# Patient Record
Sex: FEMALE | Race: WHITE
[De-identification: names, ages, dates, MRNs, and addresses within clinical notes are randomized per-mention and may not be internally consistent; named-entity substitution may affect disease eponyms.]

---

## 2018-05-02 ENCOUNTER — HOSPITAL ENCOUNTER (EMERGENCY)
Dept: HOSPITAL 97 - ER | Age: 73
Discharge: HOME | End: 2018-05-02
Payer: COMMERCIAL

## 2018-05-02 DIAGNOSIS — Z79.82: ICD-10-CM

## 2018-05-02 DIAGNOSIS — K57.32: Primary | ICD-10-CM

## 2018-05-02 DIAGNOSIS — I10: ICD-10-CM

## 2018-05-02 DIAGNOSIS — E87.5: ICD-10-CM

## 2018-05-02 DIAGNOSIS — Z88.0: ICD-10-CM

## 2018-05-02 DIAGNOSIS — E03.9: ICD-10-CM

## 2018-05-02 DIAGNOSIS — E78.00: ICD-10-CM

## 2018-05-02 DIAGNOSIS — Z88.2: ICD-10-CM

## 2018-05-02 LAB
ALBUMIN SERPL BCP-MCNC: 3.7 G/DL (ref 3.2–5.5)
ALP SERPL-CCNC: 89 IU/L (ref 42–121)
ALT SERPL W P-5'-P-CCNC: 21 IU/L (ref 10–60)
AST SERPL W P-5'-P-CCNC: 23 IU/L (ref 10–42)
BUN BLD-MCNC: 32 MG/DL (ref 6–20)
GLUCOSE SERPLBLD-MCNC: 111 MG/DL (ref 65–120)
HCT VFR BLD CALC: 41.1 % (ref 36–45)
LIPASE SERPL-CCNC: 36 U/L (ref 22–51)
LYMPHOCYTES # SPEC AUTO: 0.9 K/UL (ref 0.7–4.9)
MCH RBC QN AUTO: 28.7 PG (ref 27–35)
MCV RBC: 83.6 FL (ref 80–100)
PMV BLD: 8.2 FL (ref 7.6–11.3)
POTASSIUM SERPL-SCNC: 5.2 MEQ/L (ref 3.6–5)
RBC # BLD: 4.92 M/UL (ref 3.86–4.86)
UA COMPLETE W REFLEX CULTURE PNL UR: (no result)

## 2018-05-02 PROCEDURE — 99284 EMERGENCY DEPT VISIT MOD MDM: CPT

## 2018-05-02 PROCEDURE — 96361 HYDRATE IV INFUSION ADD-ON: CPT

## 2018-05-02 PROCEDURE — 83690 ASSAY OF LIPASE: CPT

## 2018-05-02 PROCEDURE — 74177 CT ABD & PELVIS W/CONTRAST: CPT

## 2018-05-02 PROCEDURE — 80076 HEPATIC FUNCTION PANEL: CPT

## 2018-05-02 PROCEDURE — 81015 MICROSCOPIC EXAM OF URINE: CPT

## 2018-05-02 PROCEDURE — 96360 HYDRATION IV INFUSION INIT: CPT

## 2018-05-02 PROCEDURE — 36415 COLL VENOUS BLD VENIPUNCTURE: CPT

## 2018-05-02 PROCEDURE — 80048 BASIC METABOLIC PNL TOTAL CA: CPT

## 2018-05-02 PROCEDURE — 85025 COMPLETE CBC W/AUTO DIFF WBC: CPT

## 2018-05-02 PROCEDURE — 81003 URINALYSIS AUTO W/O SCOPE: CPT

## 2018-05-02 NOTE — RAD REPORT
EXAM DESCRIPTION:  CTAbdomen   Pelvis W Contrast - 5/2/2018 11:53 am

 

CLINICAL HISTORY:  Abdominal pain.

 

COMPARISON:  None.

 

TECHNIQUE:  Biphasic CT imaging of the abdomen and pelvis was performed with 100 ml non-ionic IV cont
rast.

 

All CT scans are performed using dose optimization technique as appropriate and may include automated
 exposure control or mA/KV adjustment according to patient size.

 

FINDINGS:  The lung bases are clear.

 

The liver, spleen, pancreas, right adrenal gland and kidneys are within normal limits. Mild thickenin
g of the left adrenal gland seen. Bilateral renal cysts are noted.

 

No bowel obstruction, free air, free fluid or abscess. Short segment of the descending colon demonstr
ates mild to moderate pericolonic inflammatory changes where several diverticula are noted compatible
 with mild acute diverticulitis. A more significant length of the sigmoid colon measuring about 8 cm 
demonstrates similar findings of pericolonic inflammatory changes in significant diverticulosis. This
 is compatible with additional area of acute diverticulitis. No peridiverticular abscess seen. The ap
pendix is normal.  No evidence of significant lymphadenopathy.

 

Moderate lumbosacral degenerative changes.

 

IMPRESSION:  Mild acute diverticulitis is present involving the sigmoid colon and distal descending c
olon without peridiverticular abscess or other complication seen.

## 2018-05-02 NOTE — EDPHYS
Physician Documentation                                                                           

 [facility]                                                                

Name: Margie Boyer                                                                          

Age: 72 yrs                                                                                       

Sex: Female                                                                                       

: 1945                                                                                   

MRN: [id number]                                                                                   

Arrival Date: 2018                                                                          

Time: 08:21                                                                                       

Account#: [id number]                                                                            

Bed 8                                                                                             

Private MD: Lili Roman                                                                         

ED Physician Sean Flores                                                                      

HPI:                                                                                              

                                                                                             

09:05 This 72 yrs old  Female presents to ER via Ambulatory with complaints of       jr8 

      Abdominal Pain.                                                                             

09:05 The patient presents with abdominal pain in the left upper quadrant, in the left lower  jr8 

      quadrant. Onset: The symptoms/episode began/occurred acutely, yesterday. The symptoms       

      do not radiate. Associated signs and symptoms: none. The symptoms are described as          

      sharp. Modifying factors: The symptoms are alleviated by nothing, the symptoms are          

      aggravated by nothing. Severity of pain: At its worst the pain was moderate in the          

      emergency department the pain is unchanged. The patient has not experienced similar         

      symptoms in the past. The patient has not recently seen a physician. Patient stated         

      that she has had abdominal on/off for the past few weeks. Last night was severe. Has        

      never had pain that bad. Denies fevers, n/v/d .                                             

                                                                                                  

Historical:                                                                                       

- Allergies:                                                                                      

09:00 PENICILLINS;                                                                            dm5 

09:00 Sulfa (Sulfonamide Antibiotics);                                                        dm5 

- Home Meds:                                                                                      

09:18 levothyroxine 25 mcg tab 1 tab once daily [Active]; nadolol 40 mg oral tab 1 tab once   dm5 

      daily [Active]; atorvastatin 10 mg oral tab 1 tab once daily [Active]; spironolactone       

      50 mg Oral tab 1 tab 2 times per day [Active]; alendronate 35 mg oral tab 1 tab once        

      wkly [Active]; Caltrate 600 + D oral oral [Active]; Fish Oil 1,000 mg oral cap daily        

      [Active]; Centrum Silver oral oral [Active]; Glucosamine oral oral [Active];                

      PreserVision Lutein oral oral [Active]; Pk Aspirin oral 81 mg oral 1 tab once daily      

      [Active];                                                                                   

- PMHx:                                                                                           

09:00 Hypertension; High Cholesterol; Hypothyroidism; Diverticulitis;                         dm5 

                                                                                                  

- Immunization history:: Adult Immunizations up to date.                                          

- Social history:: Smoking status: Patient/guardian denies using tobacco.                         

                                                                                                  

                                                                                                  

ROS:                                                                                              

09:05 Eyes: Negative for injury, pain, redness, and discharge, ENT: Negative for injury,      jr8 

      pain, and discharge, Neck: Negative for injury, pain, and swelling, Cardiovascular:         

      Negative for chest pain, palpitations, and edema, Respiratory: Negative for shortness       

      of breath, cough, wheezing, and pleuritic chest pain, Back: Negative for injury and         

      pain, MS/Extremity: Negative for injury and deformity, Skin: Negative for injury, rash,     

      and discoloration, Neuro: Negative for headache, weakness, numbness, tingling, and          

      seizure.                                                                                    

09:05 Abdomen/GI: Positive for abdominal pain, Negative for nausea, vomiting, and diarrhea,       

      abdominal distension, anorexia, dysphagia, hematemesis, black/tarry stool, rectal pain,     

      rectal bleeding, bowel incontinence, flatulence.                                            

                                                                                                  

Exam:                                                                                             

09:05 Eyes:  Pupils equal round and reactive to light, extra-ocular motions intact.  Lids and jr8 

      lashes normal.  Conjunctiva and sclera are non-icteric and not injected.  Cornea within     

      normal limits.  Periorbital areas with no swelling, redness, or edema. ENT:  Nares          

      patent. No nasal discharge, no septal abnormalities noted.  Tympanic membranes are          

      normal and external auditory canals are clear.  Oropharynx with no redness, swelling,       

      or masses, exudates, or evidence of obstruction, uvula midline.  Mucous membranes           

      moist. Neck:  Trachea midline, no thyromegaly or masses palpated, and no cervical           

      lymphadenopathy.  Supple, full range of motion without nuchal rigidity, or vertebral        

      point tenderness.  No Meningismus. Cardiovascular:  Regular rate and rhythm with a          

      normal S1 and S2.  No gallops, murmurs, or rubs.  Normal PMI, no JVD.  No pulse             

      deficits. Respiratory:  Lungs have equal breath sounds bilaterally, clear to                

      auscultation and percussion.  No rales, rhonchi or wheezes noted.  No increased work of     

      breathing, no retractions or nasal flaring. Back:  No spinal tenderness.  No                

      costovertebral tenderness.  Full range of motion. Skin:  Warm, dry with normal turgor.      

      Normal color with no rashes, no lesions, and no evidence of cellulitis. MS/ Extremity:      

      Pulses equal, no cyanosis.  Neurovascular intact.  Full, normal range of motion. Neuro:     

       Awake and alert, GCS 15, oriented to person, place, time, and situation.  Cranial          

      nerves II-XII grossly intact.  Motor strength 5/5 in all extremities.  Sensory grossly      

      intact.  Cerebellar exam normal.  Normal gait.                                              

09:05 Abdomen/GI: Inspection: obese Bowel sounds: active, all quadrants, Palpation: soft, in      

      all quadrants, moderate abdominal tenderness, in the mid left abdomen , mass, is not        

      appreciated, rebound tenderness, is not appreciated, voluntary guarding, is not             

      appreciated, involuntary guarding, is not appreciated, no appreciated organomegaly,         

      Indicators: McBurney's point is not tender, Freeman's sign is negative, Rovsing's sign       

      is negative, Liver: no appreciated palpable abnormalities, tenderness, is not               

      appreciated.                                                                                

                                                                                                  

Vital Signs:                                                                                      

09:00  / 90; Pulse 70; Resp 18; Temp 97.7; Pulse Ox 100% on R/A; Weight 104.33 kg (R);  dm5 

      Height 5 ft. 3 in. (160.02 cm); Pain 2/10;                                                  

10:29  / 80; Pulse 62; Resp 19; Pulse Ox 100% on R/A;                                   ae1 

12:04  / 57; Pulse 76; Resp 22; Pulse Ox 100% on R/A;                                   ae1 

12:39  / 90; Pulse 80; Resp 19; Pulse Ox 97% on R/A;                                    ae1 

09:00 Body Mass Index 40.74 (104.33 kg, 160.02 cm)                                            dm5 

                                                                                                  

MDM:                                                                                              

08:45 Patient medically screened.                                                             jr8 

12:33 Data reviewed: vital signs, nurses notes, lab test result(s), radiologic studies, CT    jr8 

      scan, and as a result, I will discharge patient. Data interpreted: Pulse oximetry: on       

      room air is 100 %. Interpretation: normal. Counseling: I had a detailed discussion with     

      the patient and/or guardian regarding: the historical points, exam findings, and any        

      diagnostic results supporting the discharge/admit diagnosis, lab results, radiology         

      results, the need for outpatient follow up, a family practitioner, to return to the         

      emergency department if symptoms worsen or persist or if there are any questions or         

      concerns that arise at home. ED course: Discussed with patient that she needs to be off     

      of her potassium sparing diuretic. Will give medicine to reduce potassium but that I        

      want her to be seen by Ms. Roman and have blood redraw this week. Patient understood        

      and would follow up .                                                                       

                                                                                                  

                                                                                             

09:05 Order name: Basic Metabolic Panel; Complete Time: 10:19                                 jr8 

                                                                                             

09:05 Order name: CBC with Diff; Complete Time: 10:                                                                                             

09:05 Order name: Creatinine for Radiology; Complete Time: 10:                                                                                             

09:05 Order name: Hepatic Function; Complete Time: 10:                                                                                             

09:05 Order name: Lipase; Complete Time: 10:                                                                                             

09:05 Order name: Urine Microscopic Only; Complete Time: 10:                                                                                             

09:05 Order name: IV Saline Lock; Complete Time: :                                                                                             

09:05 Order name: Labs collected and sent; Complete Time: :                                                                                             

09:43 Order name: Urine Dipstick--Ancillary (enter results); Complete Time: 10:             [facility] 

                                                                                             

10:20 Order name: CT Abd/Pelvis - W/Contrast; Complete Time: 12:                                                                                             

09:05 Order name: Urine Dipstick-Ancillary (obtain specimen); Complete Time: : 

                                                                                                  

Administered Medications:                                                                         

10:29 Drug: NS 0.9% 1000 ml Route: IV; Rate: 1000 ml; Site: right antecubital;                ae1 

12:51 Follow up: IV Status: Completed infusion                                                ae1 

12:30 Drug: Kayexalate 30 grams Route: PO;                                                    ae1 

12:51 Follow up: Response: Medication administered at discharge.                              ae1 

12:30 Drug: Cipro 500 mg Route: PO;                                                           ae1 

12:51 Follow up: Response: Medication administered at discharge.                              ae1 

12:30 Drug: Flagyl 1 grams Route: PO;                                                         ae1 

12:50 Follow up: Response: Medication administered at discharge.                              ae1 

                                                                                                  

                                                                                                  

Disposition:                                                                                      

18 12:36 Discharged to Home. Impression: Diverticulitis of large intestine without          

  perforation or abscess without bleeding, Hyperkalemia.                                          

- Condition is Stable.                                                                            

- Discharge Instructions: Diverticulitis, Hyperkalemia.                                           

- Prescriptions for Cipro 500 mg Oral Tablet - take 1 tablet by ORAL route every 12               

  hours for 10 days; 20 tablet. Flagyl 500 mg Oral Tablet - take 1 tablet by ORAL route           

  every 6 hours for 10 days; 40 tablet. Zofran 4 mg Oral Tablet - take 1 tablet by ORAL           

  route every 12 hours As needed; 20 tablet. Tramadol 50 mg Oral Tablet - take 1 tablet           

  by ORAL route every 8 hours as needed; 12 tablet.                                               

- Medication Reconciliation Form, Thank You Letter, Antibiotic Education, Prescription            

  Opioid Use form.                                                                                

- Follow up: Lili Roman; When: 1 - 2 days; Reason: Recheck today's complaints,                 

  Continuance of care, Re-evaluation by your physician.                                           

- Problem is new.                                                                                 

- Symptoms have improved.                                                                         

                                                                                                  

                                                                                                  

                                                                                                  

Addendum:                                                                                         

2018                                                                                        

     06:38 Co-signature as Attending Physician, Sean Flores MD I agree with the assessment and  w
a

           plan of care.                                                                          

                                                                                                  

Signatures:                                                                                       

Dispatcher [employer]                           EDMS                                                 

Rupali Guzmán, RN                    RN   dm5                                                  

Bartolo López PA                        PA   jr8                                                  

Vik Matamoros RN                     RN   ae1                                                  

Sean Flores MD MD   wa                                                   

                                                                                                  

Corrections: (The following items were deleted from the chart)                                    

                                                                                             

12:51 12:36 2018 12:36 Discharged to Home. Impression: Diverticulitis of large          ae1 

      intestine without perforation or abscess without bleeding; Hyperkalemia. Condition is       

      Stable. Forms are Medication Reconciliation Form, Thank You Letter, Antibiotic              

      Education, Prescription Opioid Use. Follow up: Lili Roman; When: 1 - 2 days; Reason:      

      Recheck today's complaints, Continuance of care, Re-evaluation by your physician.           

      Problem is new. Symptoms have improved. jr8                                                 

                                                                                                  

**************************************************************************************************

## 2018-05-02 NOTE — ER
Nurse's Notes                                                                                     

 [facility]                                                                

Name: Margie Boyer                                                                          

Age: 72 yrs                                                                                       

Sex: Female                                                                                       

: 1945                                                                                   

MRN: [id number]                                                                                   

Arrival Date: 2018                                                                          

Time: 08:21                                                                                       

Account#: [id number]                                                                            

Bed 8                                                                                             

Private MD: Lili Roman                                                                         

Diagnosis: Diverticulitis of large intestine without perforation or abscess without               

  bleeding;Hyperkalemia                                                                           

                                                                                                  

Presentation:                                                                                     

                                                                                             

08:56 Presenting complaint: Patient states: abdominal pain for about a week. Thought it was   dm5 

      diverticulitis flare but went to [facility] Roman and was told it probably was not that. Pt     

      had a KUB last week that showed some gas but no obstruction. Pt states it feels like        

      she may be constipated. Transition of care: patient was not received from another           

      setting of care. Onset of symptoms was 2018. Initial Sepsis Screen: Does the      

      patient meet any 2 criteria? No. Patient's initial sepsis screen is negative. Does the      

      patient have a suspected source of infection? No. Patient's initial sepsis screen is        

      negative. Care prior to arrival: None.                                                      

08:56 Method Of Arrival: Ambulatory                                                           dm5 

08:56 Acuity: ZEE 3                                                                           dm5 

                                                                                                  

Triage Assessment:                                                                                

09:00 General: Appears in no apparent distress. uncomfortable, Behavior is calm, cooperative. dm5 

      Pain: Complains of pain in anterior aspect of left lateral abdomen, left upper quadrant     

      and left lower quadrant Pain currently is 2 out of 10 on a pain scale. at worst was 7       

      out of 10 on a pain scale. Pain began about a week. Neuro: Level of Consciousness is        

      awake, alert, obeys commands, Oriented to person, place, time. Respiratory: Airway is       

      patent. GI: Abdomen is round obese, Bowel sounds present X 4 quads. Derm: Skin is pink,     

      warm \T\ dry.                                                                               

                                                                                                  

Historical:                                                                                       

- Allergies:                                                                                      

09:00 PENICILLINS;                                                                            dm5 

09:00 Sulfa (Sulfonamide Antibiotics);                                                        dm5 

- Home Meds:                                                                                      

09:18 levothyroxine 25 mcg tab 1 tab once daily [Active]; nadolol 40 mg oral tab 1 tab once   dm5 

      daily [Active]; atorvastatin 10 mg oral tab 1 tab once daily [Active]; spironolactone       

      50 mg Oral tab 1 tab 2 times per day [Active]; alendronate 35 mg oral tab 1 tab once        

      wkly [Active]; Caltrate 600 + D oral oral [Active]; Fish Oil 1,000 mg oral cap daily        

      [Active]; Centrum Silver oral oral [Active]; Glucosamine oral oral [Active];                

      PreserVision Lutein oral oral [Active]; Pk Aspirin oral 81 mg oral 1 tab once daily      

      [Active];                                                                                   

- PMHx:                                                                                           

09:00 Hypertension; High Cholesterol; Hypothyroidism; Diverticulitis;                         dm5 

                                                                                                  

- Immunization history:: Adult Immunizations up to date.                                          

- Social history:: Smoking status: Patient/guardian denies using tobacco.                         

                                                                                                  

                                                                                                  

Screening:                                                                                        

10:30 Abuse screen: Denies threats or abuse. Nutritional screening: No deficits noted.        ae1 

      Tuberculosis screening: No symptoms or risk factors identified. Fall Risk None              

      identified.                                                                                 

                                                                                                  

Assessment:                                                                                       

10:30 General: Appears in no apparent distress. comfortable, obese. Pain: Complains of pain   ae1 

      in abdomen. Neuro: Level of Consciousness is awake, alert, obeys commands, Oriented to      

      person, place, time, situation. Cardiovascular: Patient's skin is warm and dry.             

      Respiratory: Airway is patent Breath sounds are clear bilaterally. GI: Abdomen is round     

      obese, Bowel sounds present X 4 quads. Abdomen is firm. GI: Reports nausea. : No          

      signs and/or symptoms were reported regarding the genitourinary system. EENT: No signs      

      and/or symptoms were reported regarding the EENT system. Derm: Skin is pink, warm \T\       

      dry. Musculoskeletal: No signs and/or symptoms reported regarding the musculoskeletal       

      system.                                                                                     

10:44 Reassessment: Patient appears in no apparent distress at this time. Patient and/or      ae1 

      family updated on plan of care and expected duration. Pain level reassessed. Patient        

      states feeling better.                                                                      

11:51 Reassessment: Patient is in CT, IV infiltrated while IV contrast infusing, new IV       ae1 

      established by [facility] ED TECH.                                                            

                                                                                                  

Vital Signs:                                                                                      

09:00  / 90; Pulse 70; Resp 18; Temp 97.7; Pulse Ox 100% on R/A; Weight 104.33 kg (R);  dm5 

      Height 5 ft. 3 in. (160.02 cm); Pain 2/10;                                                  

10:29  / 80; Pulse 62; Resp 19; Pulse Ox 100% on R/A;                                   ae1 

12:04  / 57; Pulse 76; Resp 22; Pulse Ox 100% on R/A;                                   ae1 

12:39  / 90; Pulse 80; Resp 19; Pulse Ox 97% on R/A;                                    ae1 

09:00 Body Mass Index 40.74 (104.33 kg, 160.02 cm)                                            dm5 

                                                                                                  

ED Course:                                                                                        

08:21 Patient arrived in ED.                                                                  mr  

08:21 Lili Roman is Private Physician.                                                     mr  

08:45 Bartolo López PA is PHCP.                                                               jr8 

08:45 Sean Flores MD is Attending Physician.                                             jr8 

08:58 Triage completed.                                                                       dm5 

09:00 Arm band placed on right wrist. Patient placed in an exam room, on a stretcher.         dm5 

09:31 Initial lab(s) drawn, by me, sent to lab. Urine collected: clean catch specimen,        jb1 

      cloudy, deidre colored. Inserted saline lock: 22 gauge in right antecubital area, using      

      aseptic technique. Blood collected.                                                         

09:59 Vki Matamoros, RN is Primary Nurse.                                                   ae1 

10:30 Bed in low position. Call light in reach. Side rails up X 1. Adult w/ patient. Pulse ox ae1 

      on. NIBP on. Warm blanket given.                                                            

11:02 Patient moved to CT via wheelchair.                                                     kw1 

11:54 CT Abd/Pelvis - W/Contrast In Process Unspecified.                                      EDMS

12:35 Lili Roman is Referral Physician.                                                    jr8 

12:49 No provider procedures requiring assistance completed. IV discontinued, intact,         ae1 

      bleeding controlled, No redness/swelling at site. Pressure dressing applied.                

                                                                                                  

Administered Medications:                                                                         

10:29 Drug: NS 0.9% 1000 ml Route: IV; Rate: 1000 ml; Site: right antecubital;                ae1 

12:51 Follow up: IV Status: Completed infusion                                                ae1 

12:30 Drug: Kayexalate 30 grams Route: PO;                                                    ae1 

12:51 Follow up: Response: Medication administered at discharge.                              ae1 

12:30 Drug: Cipro 500 mg Route: PO;                                                           ae1 

12:51 Follow up: Response: Medication administered at discharge.                              ae1 

12:30 Drug: Flagyl 1 grams Route: PO;                                                         ae1 

12:50 Follow up: Response: Medication administered at discharge.                              ae1 

                                                                                                  

                                                                                                  

Outcome:                                                                                          

12:36 Discharge ordered by MD.                                                                jr8 

12:50 Discharged to home ambulatory, with significant other.                                  ae1 

12:50 Condition: stable                                                                           

12:50 Discharge instructions given to patient, Instructed on discharge instructions, follow       

      up and referral plans. medication usage, Demonstrated understanding of instructions,        

      Prescriptions given X 4.                                                                    

12:51 Patient left the ED.                                                                    ae1 

                                                                                                  

Signatures:                                                                                       

Dispatcher [employer]                           EDMS                                                 

Bart Baez                                 jb1                                                  

Rupali Guzmán, RN                    RN   dm5                                                  

Nilsa Rodriguez                                mr                                                   

Bartolo López PA PA jr8                                                  

Vik Matamoros RN                     RN   ae1                                                  

Mila Valentino                            1                                                  

                                                                                                  

**************************************************************************************************

## 2018-05-10 ENCOUNTER — HOSPITAL ENCOUNTER (EMERGENCY)
Dept: HOSPITAL 97 - ER | Age: 73
Discharge: HOME | End: 2018-05-10
Payer: COMMERCIAL
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
## 2018-05-10 DIAGNOSIS — Z88.2: ICD-10-CM

## 2018-05-10 DIAGNOSIS — M10.9: ICD-10-CM

## 2018-05-10 DIAGNOSIS — E03.9: ICD-10-CM

## 2018-05-10 DIAGNOSIS — Z88.0: ICD-10-CM

## 2018-05-10 DIAGNOSIS — E78.00: ICD-10-CM
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
## 2018-05-10 DIAGNOSIS — I10: ICD-10-CM

## 2018-05-10 DIAGNOSIS — S50.812A: Primary | ICD-10-CM
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
## 2018-05-10 PROCEDURE — 99283 EMERGENCY DEPT VISIT LOW MDM: CPT

## 2018-05-10 NOTE — EDPHYS
Physician Documentation                                                                           

 [facility]                                                                

Name: Margie Boyer                                                                          

Age: 72 yrs                                                                                       

Sex: Female                                                                                       

: 1945                                                                                   

MRN: [id number]                                                                                   

Arrival Date: 05/10/2018                                                                          

Time: 08:54                                                                                       

Account#: [id number]                                                                            

Bed 19                                                                                            

Private MD:                                                                                       

ED Physician Gentry Whalen                                                                      

HPI:                                                                                              

05/10                                                                                             

11:00 This 72 yrs old  Female presents to ER via Ambulatory with complaints of Fall  pm1 

      Injury, Foot Pain, Arm Injury.                                                              

11:00 Details of fall: The patient fell from an upright position, while walking. Onset: The   pm1 

      symptoms/episode began/occurred last night. Associated injuries: The patient sustained      

      abrasion to left forearm. The patient has been recently seen at the [facility] Emergency Department, for unrelated complaints, diagnosed with                

      diverticulitis. Improvement in symptoms and no abdominal pain with antibiotics .            

      tetanus up to date. Patient with a left toe gout flare up for the past 2-3 days.            

      patient stepped down to lower floor of the garage and experienced pain to left toe from     

      gout. Patient fell forward and scrapped her left forearm against the carpet. No head        

      injury, headache or neck pain. Patient taking indomethacin for gout.                        

                                                                                                  

Historical:                                                                                       

- Allergies:                                                                                      

09:00 PENICILLINS;                                                                            rb1 

09:00 Sulfa (Sulfonamide Antibiotics);                                                        rb1 

- Home Meds:                                                                                      

09:00 alendronate 25 Oral tab 1 tab once wkly [Active]; atorvastatin 10 mg Oral tab 1 tab     rb1 

      once daily [Active]; Pk Aspirin 81 mg Oral 1 tab three times a day [Active];             

      Caltrate 600 + D Oral daily [Active]; Centrum Silver Oral daily [Active]; Glucosamine       

      Oral daily [Active]; Fish Oil 1,000 mg Oral cap daily [Active]; levothyroxine 25 mcg        

      tab 1 tab once daily [Active]; nadolol 40 mg Oral tab 1 tab once daily [Active];            

      PreserVision Lutein Oral [Active]; spironolactone 50 mg Oral tab 1 tab 2 times per day      

      [Active];                                                                                   

- PMHx:                                                                                           

09:00 Diverticulitis; High Cholesterol; Hypertension; Hypothyroidism; Gout;                   rb1 

- PSHx:                                                                                           

09:00 Hysterectomy; Hernia repair; Cholecystectomy;                                           rb1 

                                                                                                  

- Immunization history:: Adult Immunizations up to date, Last tetanus immunization: up            

  to date.                                                                                        

- Social history:: Smoking status: Patient/guardian denies using tobacco.                         

                                                                                                  

                                                                                                  

ROS:                                                                                              

11:00 Constitutional: Negative for fever, chills, and weight loss, Eyes: Negative for injury, pm1 

      pain, redness, and discharge, ENT: Negative for injury, pain, and discharge, Neck:          

      Negative for injury, pain, and swelling, Cardiovascular: Negative for chest pain,           

      palpitations, and edema, Respiratory: Negative for shortness of breath, cough,              

      wheezing, and pleuritic chest pain, Abdomen/GI: Negative for abdominal pain, nausea,        

      vomiting, diarrhea, and constipation, Back: Negative for injury and pain, : Negative      

      for injury, bleeding, discharge, and swelling.                                              

11:00 Neuro: Negative for headache, weakness, numbness, tingling, and seizure.                    

11:00 MS/extremity: Positive for pain, of the left first toe.                                     

11:00 Skin: Positive for abrasion(s), of the left arm.                                            

                                                                                                  

Exam:                                                                                             

11:00 Constitutional:  This is a well developed, well nourished patient who is awake, alert,  pm1 

      and in no acute distress. Head/Face:  Normocephalic, atraumatic. Eyes:  Pupils equal        

      round and reactive to light, extra-ocular motions intact.  Lids and lashes normal.          

      Conjunctiva and sclera are non-icteric and not injected.  Cornea within normal limits.      

      Periorbital areas with no swelling, redness, or edema. ENT:  Nares patent. No nasal         

      discharge, no septal abnormalities noted.  Tympanic membranes are normal and external       

      auditory canals are clear.  Oropharynx with no redness, swelling, or masses, exudates,      

      or evidence of obstruction, uvula midline.  Mucous membranes moist. Neck:  Trachea          

      midline, no thyromegaly or masses palpated, and no cervical lymphadenopathy.  Supple,       

      full range of motion without nuchal rigidity, or vertebral point tenderness.  No            

      Meningismus. Chest/axilla:  Normal chest wall appearance and motion.  Nontender with no     

      deformity.  No lesions are appreciated. Cardiovascular:  Regular rate and rhythm with a     

      normal S1 and S2.  No gallops, murmurs, or rubs.  Normal PMI, no JVD.  No pulse             

      deficits. Respiratory:  Lungs have equal breath sounds bilaterally, clear to                

      auscultation and percussion.  No rales, rhonchi or wheezes noted.  No increased work of     

      breathing, no retractions or nasal flaring. Abdomen/GI:  Soft, non-tender, with normal      

      bowel sounds.  No distension or tympany.  No guarding or rebound.  No evidence of           

      tenderness throughout. Back:  No spinal tenderness.  No costovertebral tenderness.          

      Full range of motion.                                                                       

11:00 Musculoskeletal/extremity: Extremities: grossly normal except: noted in the PIP of          

      first left toe: swelling, tenderness.                                                       

11:00 Skin: injury, abrasion(s), small abrasion noted, of the dorsal aspect of left forearm.      

                                                                                                  

Vital Signs:                                                                                      

09:00  / 85; Pulse 65; Resp 19; Pulse Ox 98% on R/A; Weight 104.33 kg (R); Height 5 ft. rb1 

      3 in. (160.02 cm); Pain 3/10;                                                               

10:00  / 79; Pulse 62; Resp 17; Pulse Ox 99% on R/A;                                    rb1 

11:05  / 74; Pulse 55; Resp 16; Pulse Ox 97% on R/A;                                    aj  

09:00 Body Mass Index 40.74 (104.33 kg, 160.02 cm)                                            rb1 

                                                                                                  

MDM:                                                                                              

09:05 Patient medically screened.                                                             pm1 

10:06 Data reviewed: vital signs. Data interpreted: Pulse oximetry: on room air is 98 %.      pm1 

      Interpretation: normal. Counseling: I had a detailed discussion with the patient and/or     

      guardian regarding: the historical points, exam findings, and any diagnostic results        

      supporting the discharge/admit diagnosis, the need for outpatient follow up, to return      

      to the emergency department if symptoms worsen or persist or if there are any questions     

      or concerns that arise at home.                                                             

                                                                                                  

05/10                                                                                             

10:16 Order name: Wound Care; Complete Time: 11:05                                            pm1 

05/10                                                                                             

10:16 Order name: Wound dressing; Complete Time: 11:05                                        pm1 

                                                                                                  

Administered Medications:                                                                         

10:08 Drug: HYDROcodone-acetaminophen 5 mg-325 mg 1 tabs Route: PO;                           rb1 

11:05 Follow up: Response: No adverse reaction                                                aa5 

11:05 Drug: Triple Antibiotic Ointment 1 application Route: Topical; Site: wound;             aa5 

                                                                                                  

                                                                                                  

Disposition:                                                                                      

                                                                                             

07:16 Co-signature as Attending Physician, Gentry Whalen MD I agree with the assessment and  rhiannon 

      plan of care.                                                                               

                                                                                                  

Disposition:                                                                                      

05/10/18 10:10 Discharged to Home. Impression: Gout, unspecified - left great toe, Abrasion of    

  left forearm.                                                                                   

- Condition is Stable.                                                                            

- Discharge Instructions: Abrasion, Gout, Low-Purine Diet.                                        

                                                                                                  

- Medication Reconciliation Form, Thank You Letter, Prescription Opioid Use form.                 

- Follow up: Emergency Department; When: As needed; Reason: Worsening of condition.               

  Follow up: Private Physician; When: 2 - 3 days; Reason: Recheck today's complaints,             

  Continuance of care, Re-evaluation by your physician.                                           

- Problem is new.                                                                                 

- Symptoms have improved.                                                                         

                                                                                                  

                                                                                                  

                                                                                                  

Signatures:                                                                                       

Dona Hickman RN                       Gentry Gibson MD MD cha Calderon, Audri RN                     RN   aa5                                                  

Mere Ny, RN                     RN   rb1                                                  

Herb Rodriguez, GUY                    NP   pm1                                                  

                                                                                                  

Corrections: (The following items were deleted from the chart)                                    

05/10                                                                                             

11:06 10:10 05/10/2018 10:10 Discharged to Home. Impression: Gout, unspecified - left great   aj  

      toe; Abrasion of left forearm. Condition is Stable. Forms are Medication Reconciliation     

      Form, Thank You Letter, Antibiotic Education, Prescription Opioid Use. Follow up:           

      Emergency Department; When: As needed; Reason: Worsening of condition. Follow up:           

      Private Physician; When: 2 - 3 days; Reason: Recheck today's complaints, Continuance of     

      care, Re-evaluation by your physician. Problem is new. Symptoms have improved. pm1          

                                                                                                  

**************************************************************************************************

## 2018-05-10 NOTE — ER
Nurse's Notes                                                                                     

 [facility]                                                                

Name: Margie Boyer                                                                          

Age: 72 yrs                                                                                       

Sex: Female                                                                                       

: 1945                                                                                   

MRN: [id number]                                                                                   

Arrival Date: 05/10/2018                                                                          

Time: 08:54                                                                                       

Account#: [id number]                                                                            

Bed 19                                                                                            

Private MD:                                                                                       

Diagnosis: Gout, unspecified-left great toe;Abrasion of left forearm                              

                                                                                                  

Presentation:                                                                                     

05/10                                                                                             

09:00 Acuity: ZEE 4                                                                           rb1 

09:00 Presenting complaint: Patient states: She fell last night around 7:00 and got a skin    rb1 

      tear on her left forearm. She stated, "The reason I fell was because my left foot is        

      hurting and when I stepped down, it shot a pain through my foot and I fell." Pt. denies     

      hitting her head. Transition of care: patient was not received from another setting of      

      care. Onset of symptoms was May 09, 2018 at 19:00. Initial Sepsis Screen: Does the          

      patient meet any 2 criteria? No. Patient's initial sepsis screen is negative. Does the      

      patient have a suspected source of infection? No. Patient's initial sepsis screen is        

      negative. Care prior to arrival: bandage placed on the left forearm.                        

09:00 Method Of Arrival: Ambulatory                                                           rb1 

                                                                                                  

Triage Assessment:                                                                                

09:00 General: Appears in no apparent distress. comfortable, obese, Behavior is calm,         rb1 

      cooperative. Pain: Complains of pain in left first toe Pain currently is 3 out of 10 on     

      a pain scale. Neuro: Level of Consciousness is awake, alert, obeys commands, Oriented       

      to person, place, time, situation. Cardiovascular: Capillary refill < 3 seconds is          

      brisk in bilateral toes. Respiratory: Airway is patent Respiratory effort is even,          

      unlabored, Respiratory pattern is regular, symmetrical. GI: Reports diarrhea, currently     

      being treated for diverticulitis. : No signs and/or symptoms were reported regarding      

      the genitourinary system. Derm: Bruising that is dark purple, on left forearm and left      

      index finger. Musculoskeletal: Swelling present in right leg and left leg.                  

                                                                                                  

Historical:                                                                                       

- Allergies:                                                                                      

09:00 PENICILLINS;                                                                            rb1 

09:00 Sulfa (Sulfonamide Antibiotics);                                                        rb1 

- Home Meds:                                                                                      

09:00 alendronate 25 Oral tab 1 tab once wkly [Active]; atorvastatin 10 mg Oral tab 1 tab     rb1 

      once daily [Active]; Pk Aspirin 81 mg Oral 1 tab three times a day [Active];             

      Caltrate 600 + D Oral daily [Active]; Centrum Silver Oral daily [Active]; Glucosamine       

      Oral daily [Active]; Fish Oil 1,000 mg Oral cap daily [Active]; levothyroxine 25 mcg        

      tab 1 tab once daily [Active]; nadolol 40 mg Oral tab 1 tab once daily [Active];            

      PreserVision Lutein Oral [Active]; spironolactone 50 mg Oral tab 1 tab 2 times per day      

      [Active];                                                                                   

- PMHx:                                                                                           

09:00 Diverticulitis; High Cholesterol; Hypertension; Hypothyroidism; Gout;                   rb1 

- PSHx:                                                                                           

09:00 Hysterectomy; Hernia repair; Cholecystectomy;                                           rb1 

                                                                                                  

- Immunization history:: Adult Immunizations up to date, Last tetanus immunization: up            

  to date.                                                                                        

- Social history:: Smoking status: Patient/guardian denies using tobacco.                         

                                                                                                  

                                                                                                  

Screening:                                                                                        

10:05 Abuse screen: Denies threats or abuse. Nutritional screening: No deficits noted.        rb1 

      Tuberculosis screening: No symptoms or risk factors identified. Fall Risk Fall in past      

      12 months (25 points). Secondary diagnosis (15 points) impaired mobility, No IV (0          

      pts). Ambulatory Aid- None/Bed Rest/Nurse Assist (0 pts). Gait- Impaired (20 pts.).         

      Mental Status- Oriented to own ability (0 pts). Total Christianson Fall Scale indicates High       

      Risk Score (45 or more points). Fall prevention measures have been instituted. Side         

      Rails Up X 2 Placed Close to Nursing Station 1:1 Attendant Assigned Frequent                

      Obs/Assessments Occuring Family Present and informed to notify staff if the need to         

      leave the bedside As available patient and family educated on Fall Prevention Program       

      and Strategies.                                                                             

                                                                                                  

Assessment:                                                                                       

09:00 General: See triage assessment.                                                         rb1 

10:00 Reassessment: Patient appears in no apparent distress at this time. No changes from     rb1 

      previously documented assessment.                                                           

10:40 Reassessment: Patient appears in no apparent distress at this time. Patient and/or      rb1 

      family updated on plan of care and expected duration. Pain level reassessed. Patient is     

      alert, oriented x 3, equal unlabored respirations, skin warm/dry/pink. Patient states       

      symptoms have improved.                                                                     

11:00 Reassessment: Patient appears in no apparent distress at this time. No changes from     rb1 

      previously documented assessment.                                                           

                                                                                                  

Vital Signs:                                                                                      

09:00  / 85; Pulse 65; Resp 19; Pulse Ox 98% on R/A; Weight 104.33 kg (R); Height 5 ft. rb1 

      3 in. (160.02 cm); Pain 3/10;                                                               

10:00  / 79; Pulse 62; Resp 17; Pulse Ox 99% on R/A;                                    rb1 

11:05  / 74; Pulse 55; Resp 16; Pulse Ox 97% on R/A;                                    aj  

09:00 Body Mass Index 40.74 (104.33 kg, 160.02 cm)                                            rb1 

                                                                                                  

ED Course:                                                                                        

08:54 Patient arrived in ED.                                                                  sb2 

08:59 Mere Ny, RN is Primary Nurse.                                                   rb1 

08:59 Herb Rodriguez NP is PHCP.                                                           pm1 

08:59 Gentry Whalen MD is Attending Physician.                                             pm1 

09:00 Arm band placed on right wrist.                                                         rb1 

09:00 Patient has correct armband on for positive identification. Bed in low position. Call   rb1 

      light in reach. Side rails up X 1. Pulse ox on. NIBP on.                                    

09:29 Triage completed.                                                                       rb1 

11:05 Patient did not have IV access during this emergency room visit.                        aj  

11:05 Dressings: Kerlix X 1; left arm non-adherent dressing x 1 left arm. Wound care: located aa5 

      on left arm was cleaned with soap and water.                                                

11:05 No provider procedures requiring assistance completed.                                  rb1 

                                                                                                  

Administered Medications:                                                                         

10:08 Drug: HYDROcodone-acetaminophen 5 mg-325 mg 1 tabs Route: PO;                           rb1 

11:05 Follow up: Response: No adverse reaction                                                aa5 

11:05 Drug: Triple Antibiotic Ointment 1 application Route: Topical; Site: wound;             aa5 

                                                                                                  

                                                                                                  

Outcome:                                                                                          

10:10 Discharge ordered by MD.                                                                pm1 

11:05 Discharged to home ambulatory, with family.                                             aj  

11:05 Condition: good                                                                             

11:05 Discharge instructions given to patient, family, Instructed on discharge instructions,      

      follow up and referral plans. Demonstrated understanding of instructions, follow-up         

      care.                                                                                       

11:06 Patient left the ED.                                                                    aj  

                                                                                                  

Signatures:                                                                                       

Dona Hickman RN RN aj Calderon, Audri, RN                     RN   aa5                                                  

eMre Ny, RN                     RN   rb1                                                  

Herb Rodriguez NP                    NP   pm1                                                  

Jo Ann Grier                               sb2                                                  

                                                                                                  

**************************************************************************************************

## 2018-05-12 ENCOUNTER — HOSPITAL ENCOUNTER (OUTPATIENT)
Dept: HOSPITAL 97 - ER | Age: 73
Setting detail: OBSERVATION
LOS: 4 days | Discharge: NURSING, REHAB OR LONG-TERM CARE | End: 2018-05-16
Attending: INTERNAL MEDICINE | Admitting: INTERNAL MEDICINE
Payer: COMMERCIAL

## 2018-05-12 DIAGNOSIS — N30.00: Primary | ICD-10-CM

## 2018-05-12 DIAGNOSIS — K57.90: ICD-10-CM

## 2018-05-12 DIAGNOSIS — Z79.82: ICD-10-CM

## 2018-05-12 DIAGNOSIS — M1A.9XX0: ICD-10-CM

## 2018-05-12 DIAGNOSIS — M25.562: ICD-10-CM

## 2018-05-12 DIAGNOSIS — Z88.0: ICD-10-CM

## 2018-05-12 DIAGNOSIS — I10: ICD-10-CM

## 2018-05-12 DIAGNOSIS — Z88.2: ICD-10-CM

## 2018-05-12 DIAGNOSIS — E03.9: ICD-10-CM

## 2018-05-12 LAB
BLD SMEAR INTERP: (no result)
BUN BLD-MCNC: 16 MG/DL (ref 6–20)
CKMB CREATINE KINASE MB: 2.4 NG/ML (ref 0.3–4)
GLUCOSE SERPLBLD-MCNC: 155 MG/DL (ref 65–120)
HCT VFR BLD CALC: 42.3 % (ref 36–45)
INR BLD: 1.36
LYMPHOCYTES # SPEC AUTO: 0.5 K/UL (ref 0.7–4.9)
MAGNESIUM SERPL-MCNC: 1.6 MG/DL (ref 1.8–2.5)
MCH RBC QN AUTO: 27.8 PG (ref 27–35)
MCV RBC: 84.3 FL (ref 80–100)
MORPHOLOGY BLD-IMP: (no result)
PMV BLD: 8.4 FL (ref 7.6–11.3)
POTASSIUM SERPL-SCNC: 4.3 MEQ/L (ref 3.6–5)
RBC # BLD: 5.02 M/UL (ref 3.86–4.86)
UA COMPLETE W REFLEX CULTURE PNL UR: (no result)

## 2018-05-12 PROCEDURE — 72192 CT PELVIS W/O DYE: CPT

## 2018-05-12 PROCEDURE — 36415 COLL VENOUS BLD VENIPUNCTURE: CPT

## 2018-05-12 PROCEDURE — 81003 URINALYSIS AUTO W/O SCOPE: CPT

## 2018-05-12 PROCEDURE — 97163 PT EVAL HIGH COMPLEX 45 MIN: CPT

## 2018-05-12 PROCEDURE — 93005 ELECTROCARDIOGRAM TRACING: CPT

## 2018-05-12 PROCEDURE — 84484 ASSAY OF TROPONIN QUANT: CPT

## 2018-05-12 PROCEDURE — 99285 EMERGENCY DEPT VISIT HI MDM: CPT

## 2018-05-12 PROCEDURE — 87086 URINE CULTURE/COLONY COUNT: CPT

## 2018-05-12 PROCEDURE — 83735 ASSAY OF MAGNESIUM: CPT

## 2018-05-12 PROCEDURE — 84443 ASSAY THYROID STIM HORMONE: CPT

## 2018-05-12 PROCEDURE — 85025 COMPLETE CBC W/AUTO DIFF WBC: CPT

## 2018-05-12 PROCEDURE — 82550 ASSAY OF CK (CPK): CPT

## 2018-05-12 PROCEDURE — 73590 X-RAY EXAM OF LOWER LEG: CPT

## 2018-05-12 PROCEDURE — 96361 HYDRATE IV INFUSION ADD-ON: CPT

## 2018-05-12 PROCEDURE — 87088 URINE BACTERIA CULTURE: CPT

## 2018-05-12 PROCEDURE — 96365 THER/PROPH/DIAG IV INF INIT: CPT

## 2018-05-12 PROCEDURE — 72170 X-RAY EXAM OF PELVIS: CPT

## 2018-05-12 PROCEDURE — 73552 X-RAY EXAM OF FEMUR 2/>: CPT

## 2018-05-12 PROCEDURE — 85730 THROMBOPLASTIN TIME PARTIAL: CPT

## 2018-05-12 PROCEDURE — 84550 ASSAY OF BLOOD/URIC ACID: CPT

## 2018-05-12 PROCEDURE — 87493 C DIFF AMPLIFIED PROBE: CPT

## 2018-05-12 PROCEDURE — 97530 THERAPEUTIC ACTIVITIES: CPT

## 2018-05-12 PROCEDURE — 80048 BASIC METABOLIC PNL TOTAL CA: CPT

## 2018-05-12 PROCEDURE — 82553 CREATINE MB FRACTION: CPT

## 2018-05-12 PROCEDURE — 85610 PROTHROMBIN TIME: CPT

## 2018-05-12 PROCEDURE — 96375 TX/PRO/DX INJ NEW DRUG ADDON: CPT

## 2018-05-12 PROCEDURE — 73562 X-RAY EXAM OF KNEE 3: CPT

## 2018-05-12 PROCEDURE — 73700 CT LOWER EXTREMITY W/O DYE: CPT

## 2018-05-12 PROCEDURE — 93971 EXTREMITY STUDY: CPT

## 2018-05-12 PROCEDURE — 97110 THERAPEUTIC EXERCISES: CPT

## 2018-05-12 PROCEDURE — 97112 NEUROMUSCULAR REEDUCATION: CPT

## 2018-05-12 PROCEDURE — 81015 MICROSCOPIC EXAM OF URINE: CPT

## 2018-05-12 RX ADMIN — Medication SCH ML: at 21:53

## 2018-05-12 RX ADMIN — CIPROFLOXACIN SCH MLS: 2 INJECTION, SOLUTION INTRAVENOUS at 21:47

## 2018-05-12 NOTE — RAD REPORT
EXAM DESCRIPTION:  CT - Low Extremity Wo Cont - 5/12/2018 4:37 pm

 

CLINICAL HISTORY:  Fall 3 days earlier, persistent pelvic and hip pain, pain out of proportion to exa
m and imaging findings

 

COMPARISON:  CT pelvis same date, left femur films same date

 

TECHNIQUE:  Axial 2 millimeter thick images of the left femur were obtained. Imaging spans the entire
 length of the femur including the acetabulum and proximal most tibia fibula.

 

FINDINGS:  No femur fracture identified. There is no dislocation. No AVN or focal femoral head abnorm
ality. No acute or destructive bone process seen. Calcifications are present in tendon insertion to t
he greater trochanter. This is a chronic finding. Degenerative changes are present at the knee joint.
 A joint effusion is present but no finding to indicate it is related to fracture. No skeletal muscle
 mass or hematoma. Contusion or edema changes are seen in the fatty tissues anterior to the knee join
t and along the anteromedial margin of the proximal tibia.

 

IMPRESSION:  No fracture or acute finding of the left femur. Tendon calcifications are present at ins
ertion sites to the greater trochanter.

 

Degenerative change and joint effusion at the knee. No finding to indicate fracture.

 

Contusion and edema changes in the soft tissues anterior to the knee joint, patella tendon and suly
medial margin of the proximal tibia.

## 2018-05-12 NOTE — RAD REPORT
EXAM DESCRIPTION:  CT - Pelvis Wo Cont - 5/12/2018 4:32 pm

 

CLINICAL HISTORY:  Fall, pelvic pain, and exam findings out of proportion to imaging findings

 

COMPARISON:  Pelvis and hip films same date

 

TECHNIQUE:  Axial 2 millimeter thick images of the pelvis were obtained. Sagittal and coronal reforma
tted images were generated and reviewed.

 

FINDINGS:  No fracture of the bony pelvis or sacral ala. Lower lumbar prominent facet degenerative ch
jodie present with no fracture. No fracture or dislocation of either proximal femur. No skeletal muscl
e hematoma or mass. No suspicious soft tissue finding. No air or foreign body identified. Vascular ca
lcifications are seen.

 

IMPRESSION:  No fracture or suspicious finding noted.

## 2018-05-12 NOTE — RAD REPORT
EXAM DESCRIPTION:  RAD - Knee Right 3 View - 5/12/2018 2:15 pm

 

CLINICAL HISTORY:  Fall, knee pain

 

COMPARISON:  None.

 

FINDINGS:  No fracture, dislocation or periosteal reaction.No joint effusion seen. No joint space batool
rowing. Mild degenerative changes are present along the articular margins of all 3 compartments. Mini
mal spurring is present at the quadriceps attachment to the patella.

 

Clinical concerns for internal derangement or occult bony injury could be further assessed with MR im
aging.

 

IMPRESSION:  Mild degenerative change with no acute bone or joint finding.

## 2018-05-12 NOTE — ER
Nurse's Notes                                                                                     

 [facility]                                                                

Name: Margie Boyer                                                                          

Age: 72 yrs                                                                                       

Sex: Female                                                                                       

: 1945                                                                                   

MRN: [id number]                                                                                   

Arrival Date: 2018                                                                          

Time: 12:25                                                                                       

Account#: [id number]                                                                            

Bed 15                                                                                            

Private MD:                                                                                       

Diagnosis: Other slipping, tripping and stumbling and falls;Pain in left leg;Pain in left         

  knee;Urinary tract infection, site not specified                                                

                                                                                                  

Presentation:                                                                                     

                                                                                             

12:25 Presenting complaint: EMS states: called out for left knee pain that started after a    em  

      fall that occurred on Wednesday, denies LOC or hitting head, has been ambulating on leg     

      since fall but this morning was unable to stand on leg, was here on Thursday c/o gout       

      in left foot. Transition of care: patient was not received from another setting of          

      care. Onset of symptoms was May 09, 2018. Initial Sepsis Screen: Does the patient meet      

      any 2 criteria? No. Patient's initial sepsis screen is negative. Does the patient have      

      a suspected source of infection? No. Patient's initial sepsis screen is negative. Care      

      prior to arrival: None.                                                                     

12:25 Method Of Arrival: EMS: [facility]                                                em  

12:53 Acuity: ZEE 4                                                                           iw  

                                                                                                  

Historical:                                                                                       

- Allergies:                                                                                      

12:30 PENICILLINS;                                                                            em  

12:30 Sulfa (Sulfonamide Antibiotics);                                                        em  

- Home Meds:                                                                                      

12:30 alendronate 25 Oral tab 1 tab once wkly [Active]; atorvastatin 10 mg Oral tab 1 tab     em  

      once daily [Active]; Pk Aspirin 81 mg Oral 1 tab three times a day [Active];             

      Caltrate 600 + D Oral daily [Active]; Centrum Silver Oral daily [Active]; Fish Oil          

      1,000 mg Oral cap daily [Active]; Glucosamine Oral daily [Active]; levothyroxine 25 mcg     

      tab 1 tab once daily [Active]; nadolol 40 mg Oral tab 1 tab once daily [Active];            

      PreserVision Lutein Oral [Active]; spironolactone 50 mg Oral tab 1 tab 2 times per day      

      [Active];                                                                                   

- PMHx:                                                                                           

12:30 Diverticulitis; Gout; High Cholesterol; Hypertension; Hypothyroidism;                   em  

                                                                                                  

- Immunization history:: Adult Immunizations up to date.                                          

- Social history:: Smoking status: Patient/guardian denies using tobacco.                         

                                                                                                  

                                                                                                  

Screenin:32 Abuse screen: Denies threats or abuse. Nutritional screening: No deficits noted.        em  

      Tuberculosis screening: No symptoms or risk factors identified. Fall Risk None              

      identified.                                                                                 

                                                                                                  

Assessment:                                                                                       

12:35 General: Appears in no apparent distress. uncomfortable, Behavior is calm, cooperative. em  

      Pain: Complains of pain in right knee and anterior aspect of right ankle. Neuro: Level      

      of Consciousness is awake, alert, obeys commands, Oriented to person, place, time,          

      situation. Cardiovascular: Capillary refill < 3 seconds Patient's skin is warm and dry.     

      Respiratory: Airway is patent Respiratory effort is even, unlabored, Respiratory            

      pattern is regular, symmetrical. GI: Abdomen is round obese. : No signs and/or            

      symptoms were reported regarding the genitourinary system. EENT: No signs and/or            

      symptoms were reported regarding the EENT system. Derm: Skin is intact, Skin is pink,       

      warm \T\ dry. Musculoskeletal: Range of motion: limited in left knee and left ankle.        

      Injury Description: fall injury.                                                            

12:45 Reassessment: Patient appears in no apparent distress at this time. I agree with above  iw  

      assessment by Will Albert LVN.                                                             

15:00 Reassessment: Patient appears in no apparent distress at this time. Patient and/or      em  

      family updated on plan of care and expected duration. Pain level reassessed. Patient is     

      alert, oriented x 3, equal unlabored respirations, skin warm/dry/pink. Patient states       

      feeling better. Patient states symptoms have improved.                                      

16:00 Reassessment: Patient appears in no apparent distress at this time. Patient and/or      em  

      family updated on plan of care and expected duration. Pain level reassessed. Patient is     

      alert, oriented x 3, equal unlabored respirations, skin warm/dry/pink. pt c/o pain in       

      left knee after attempting to ambulate, JESSY Rinaldi notified, new orders received.            

16:37 Reassessment: attempted to ambulate pt, pt unable to get out of wheelchair, JESSY Rinaldi   em  

      notified.                                                                                   

17:09 Reassessment: Patient appears in no apparent distress at this time. Patient and/or      em  

      family updated on plan of care and expected duration. Pain level reassessed. Patient is     

      alert, oriented x 3, equal unlabored respirations, skin warm/dry/pink.                      

18:05 Reassessment: Patient appears in no apparent distress at this time. Patient and/or      em  

      family updated on plan of care and expected duration. Pain level reassessed. Patient is     

      alert, oriented x 3, equal unlabored respirations, skin warm/dry/pink.                      

19:33 General: Appears in no apparent distress. comfortable, Behavior is calm, cooperative,   ao  

      appropriate for age. Pain: Complains of pain in left ankle. Neuro: Level of                 

      Consciousness is awake, alert, obeys commands, Oriented to person, place, time,             

      situation, Moves all extremities. Speech is normal, Facial symmetry appears normal,         

      Intact. Cardiovascular: Capillary refill < 3 seconds Patient's skin is warm and dry.        

      Respiratory: Airway is patent Respiratory effort is even, unlabored, Respiratory            

      pattern is regular, symmetrical. GI: Abdomen is round obese. : No signs and/or            

      symptoms were reported regarding the genitourinary system. EENT: No signs and/or            

      symptoms were reported regarding the EENT system. Derm: Skin is intact, Skin is pink,       

      warm \T\ dry. Musculoskeletal: Range of motion: intact in all extremities. Injury           

      Description: patient report a fall.                                                         

                                                                                                  

Vital Signs:                                                                                      

12:31  / 57; Pulse 81; Resp 18; Temp 98.0(O); Pulse Ox 99% on R/A; Weight 104.33 kg;    em  

      Height 5 ft. 3 in. (160.02 cm); Pain 8/10;                                                  

14:41  / 58; Pulse 74; Resp 16; Pulse Ox 95% on R/A; Pain 2/10;                         em  

15:40  / 56; Pulse 74; Resp 18; Pulse Ox 98% on R/A;                                    em  

19:36  / 73; Pulse 72; Resp 16; Pulse Ox 100% on R/A; Pain 0/10;                        ao  

12:31 Body Mass Index 40.74 (104.33 kg, 160.02 cm)                                            em  

                                                                                                  

ED Course:                                                                                        

12:25 Patient arrived in ED.                                                                  em  

12:27 Gentry Deluca PA is PHCP.                                                                cp  

12:27 Gentry Whalen MD is Attending Physician.                                             cp  

12:31 Arm band placed on.                                                                     em  

12:32 Patient has correct armband on for positive identification. Bed in low position. Call   em  

      light in reach. Side rails up X2. Adult w/ patient.                                         

12:33 No provider procedures requiring assistance completed.                                  em  

12:39 Will Albert LVN is Primary Nurse.                                                     em  

12:53 Triage completed.                                                                       iw  

14:15 XRAY Pelvis In Process Unspecified.                                                     EDMS

14:15 XRAY Knee RIGHT 3 view In Process Unspecified.                                          EDMS

14:15 XRAY Femur LEFT In Process Unspecified.                                                 EDMS

14:15 XRAY Tib Fib LEFT In Process Unspecified.                                               EDMS

16:32 CT Pelvis wo Cont In Process Unspecified.                                               EDMS

16:37 Low Extremity Wo Cont In Process Unspecified.                                           EDMS

16:38 CT completed. Patient tolerated procedure well. Patient moved back from CT.             [id number] 

19:13 Mac Sorto MD is Hospitalizing Provider.                                            cp  

20:11 Patient admitted, IV remains in place.                                                  ao  

                                                                                                  

Administered Medications:                                                                         

13:06 Drug: Hydrocodone-Acetaminophen (7.5 mg-325 mg) 1 tabs Route: PO;                       em  

16:12 Follow up: Response: No adverse reaction; Pain is decreased                             em  

17:08 Drug: fentaNYL (PF) 25 mcg Route: IVP; Site: right antecubital;                         hj  

17:38 Follow up: Response: No adverse reaction                                                em  

18:37 Drug: NS 0.9% 1000 ml Route: IV; Rate: 100 ml/hr; Site: right antecubital;              em  

20:09 Follow up: IV Status: Infusion continued upon admission                                 ao  

19:38 Drug: Rocephin - (cefTRIAXone) 1 grams Route: IVPB; Infused Over: 30 mins; Site: right  ao  

      antecubital;                                                                                

20:09 Follow up: IV Status: Completed infusion                                                ao  

                                                                                                  

                                                                                                  

Outcome:                                                                                          

19:14 Decision to Hospitalize by Provider.                                                    cp  

20:10 Admitted to Med/surg accompanied by tech, room 205, with chart, Report called to        cesar Seals RN                                                                                   

20:10 Condition: stable                                                                           

20:10 Instructed on the need for admit.                                                           

20:47 Patient left the ED.                                                                    ao  

                                                                                                  

Signatures:                                                                                       

Dispatcher [employer]                           Lita Perkins                              [id number]                                                  

Will Albert, LVN                       LVN  em                                                   

Charo Bello, RN                     RN   Darvin Schroeder RN                      Gentry Rossi PA PA cp Ortiz, Alex RN                         RN   cesar                                                   

                                                                                                  

**************************************************************************************************

## 2018-05-12 NOTE — RAD REPORT
EXAM DESCRIPTION:  RAD - Femur Left - 5/12/2018 2:15 pm

 

CLINICAL HISTORY:  Fall, leg pain

 

COMPARISON:  None.

 

FINDINGS:  No fracture is identified.  No dislocation of the femoral head. No AVN or focal femoral he
ad abnormality. Calcifications are present and tendon insertion sites to the greater trochanter. Lowe
r lumbar and SI joint degenerative changes are present. Marginal spurring degenerative changes are pr
esent at all compartments of the knee. No pathologic bone process. No air or foreign body in the soft
 tissues.

 

 

IMPRESSION:  Degenerative changes are present but no fracture or acute bone finding.

## 2018-05-12 NOTE — EDPHYS
Physician Documentation                                                                           

 [facility]                                                                

Name: Margie Boyer                                                                          

Age: 72 yrs                                                                                       

Sex: Female                                                                                       

: 1945                                                                                   

MRN: [id number]                                                                                   

Arrival Date: 2018                                                                          

Time: 12:25                                                                                       

Account#: [id number]                                                                            

Bed 15                                                                                            

Private MD:                                                                                       

ED Physician Gentry Whalen                                                                      

HPI:                                                                                              

                                                                                             

12:45 This 72 yrs old  Female presents to ER via EMS with complaints of fall.        cp  

12:45 Details of fall: The patient fell from an upright position, while walking, and struck a cp  

      carpeted surface. Onset: The symptoms/episode began/occurred 3 day(s) ago.                  

12:45 Associated injuries: The patient sustained left leg.                                    cp  

12:45 Severity of symptoms: in the emergency department the symptoms unable to bear weight.   cp  

                                                                                                  

Historical:                                                                                       

- Allergies:                                                                                      

12:30 PENICILLINS;                                                                            em  

12:30 Sulfa (Sulfonamide Antibiotics);                                                        em  

- Home Meds:                                                                                      

12:30 alendronate 25 Oral tab 1 tab once wkly [Active]; atorvastatin 10 mg Oral tab 1 tab     em  

      once daily [Active]; Pk Aspirin 81 mg Oral 1 tab three times a day [Active];             

      Caltrate 600 + D Oral daily [Active]; Centrum Silver Oral daily [Active]; Fish Oil          

      1,000 mg Oral cap daily [Active]; Glucosamine Oral daily [Active]; levothyroxine 25 mcg     

      tab 1 tab once daily [Active]; nadolol 40 mg Oral tab 1 tab once daily [Active];            

      PreserVision Lutein Oral [Active]; spironolactone 50 mg Oral tab 1 tab 2 times per day      

      [Active];                                                                                   

- PMHx:                                                                                           

12:30 Diverticulitis; Gout; High Cholesterol; Hypertension; Hypothyroidism;                   em  

                                                                                                  

- Immunization history:: Adult Immunizations up to date.                                          

- Social history:: Smoking status: Patient/guardian denies using tobacco.                         

                                                                                                  

                                                                                                  

ROS:                                                                                              

13:00 Constitutional: Negative for body aches, chills, fever, poor PO intake.                 cp  

13:00 Eyes: Negative for injury, pain, redness, and discharge.                                cp  

13:00 ENT: Negative for drainage from ear(s), ear pain, sore throat, difficulty swallowing,       

      difficulty handling secretions.                                                             

13:00 Cardiovascular: Negative for chest pain, edema, palpitations.                               

13:00 Respiratory: Negative for cough, shortness of breath, wheezing.                             

13:00 Abdomen/GI: Negative for abdominal pain, nausea, vomiting, and diarrhea, black/tarry        

      stool, rectal bleeding.                                                                     

13:00 Back: Negative for pain at rest, pain with movement, radiated pain.                         

13:00 MS/extremity: Positive for pain, swelling, tenderness, of the left leg.                     

13:00 Skin: Negative for cellulitis, rash.                                                        

13:00 Neuro: Negative for altered mental status, dizziness, headache, seizure activity,           

      syncope, near syncope, weakness.                                                            

13:00 All other systems are negative.                                                             

                                                                                                  

Exam:                                                                                             

13:05 Constitutional: The patient appears in no acute distress, alert, awake,                 cp  

      non-diaphoretic, non-toxic, well developed, well nourished, obese, uncomfortable.           

13:05 Head/Face:  Normocephalic, atraumatic.                                                  cp  

13:05 Eyes: Periorbital structures: appear normal, Pupils: equal, round, and reactive to          

      light and accomodation, Extraocular movements: intact throughout, Conjunctiva: normal,      

      no exudate, no injection, Sclera: no appreciated abnormality, Lids and lashes: appear       

      normal, bilaterally.                                                                        

13:05 ENT: External ear(s): are unremarkable, Ear canal(s): are normal, clear, TM's: bulging,     

      is not appreciated, bilaterally, dullness, bilaterally, erythema, is not appreciated,       

      bilaterally, Nose: is normal, Mouth: Lips: moist, Oral mucosa: pink and intact, moist,      

      Posterior pharynx: is normal, airway is patent, no erythema, no exudate, Voice: is          

      normal.                                                                                     

13:05 Neck: C-spine: vertebral tenderness, is not appreciated, crepitus, is not appreciated,      

      ROM/movement: is normal, is supple, without pain, no range of motions limitations, no       

      meningismus, no nuchal rigidity.                                                            

13:05 Chest/axilla: Inspection: normal, Palpation: is normal, no crepitus, no tenderness.         

13:05 Cardiovascular: Rate: normal, Rhythm: regular.                                              

13:05 Respiratory: the patient does not display signs of respiratory distress,  Respirations:     

      normal, no use of accessory muscles, no retractions, no splinting, no tachypnea,            

      labored breathing, is not present, Breath sounds: are clear throughout, no decreased        

      breath sounds, no stridor, no wheezing.                                                     

13:05 Abdomen/GI: Inspection: obese Bowel sounds: active, all quadrants, Palpation: abdomen       

      is soft and non-tender, in all quadrants, rebound tenderness, is not appreciated,           

      voluntary guarding, is not appreciated, involuntary guarding, is not appreciated.           

13:05 Back: pain, is absent, ROM is normal, vertebral tenderness, is not appreciated.             

13:05 Musculoskeletal/extremity: Extremities: grossly normal except: noted in the left knee:      

      decreased ROM, ecchymosis, pain, swelling, tenderness, Pulses: noted to be 2+ in the        

      right radial artery, right dorsalis pedis artery, left radial artery and left dorsalis      

      pedis artery, Sensation intact.                                                             

13:05 Skin: cellulitis, is not appreciated, injury, abrasion(s), moderate sized abrasion          

      noted, of the left elbow, no rash present.                                                  

13:05 Neuro: Orientation: to person, place \T\ time. Mentation: lucid, able to follow commands,   

      Sensation: no obvious gross deficits.                                                       

18:45 ECG was reviewed by the Attending Physician.                                            cp  

                                                                                                  

Vital Signs:                                                                                      

12:31  / 57; Pulse 81; Resp 18; Temp 98.0(O); Pulse Ox 99% on R/A; Weight 104.33 kg;    em  

      Height 5 ft. 3 in. (160.02 cm); Pain 8/10;                                                  

14:41  / 58; Pulse 74; Resp 16; Pulse Ox 95% on R/A; Pain 2/10;                         em  

15:40  / 56; Pulse 74; Resp 18; Pulse Ox 98% on R/A;                                    em  

19:36  / 73; Pulse 72; Resp 16; Pulse Ox 100% on R/A; Pain 0/10;                        ao  

12:31 Body Mass Index 40.74 (104.33 kg, 160.02 cm)                                            em  

                                                                                                  

MDM:                                                                                              

12:27 Patient medically screened.                                                             cp  

13:00 Differential diagnosis: closed head injury, contusion, fracture, multiple trauma.       cp  

19:11 Data reviewed: vital signs, nurses notes, lab test result(s), EKG, radiologic studies,  cp  

      CT scan, plain films. Physician consultation: Mac Sorto MD was called at 19:12, was      

      contacted at 19:12, regarding admission, to the medical/surgical unit. patient's            

      condition.                                                                                  

                                                                                                  

                                                                                             

17:37 Order name: CBC with Diff; Complete Time: 19:09                                         cp  

                                                                                             

18:45 Interpretation: Normal except: WBC 17.4; RBC 5.02; MNA 1.5; LYMA 0.5; NEUT A 15.1; LYM% cp  

      3.1; CHEIKH% 87.0.                                                                             

                                                                                             

17:37 Order name: BMP; Complete Time: 18:44                                                   cp  

                                                                                             

18:44 Interpretation: Normal except: ; GLUC 155; CA 8.3; CRE 1.07; GFR 51.              cp  

                                                                                             

17:37 Order name: PT-INR; Complete Time: 18:44                                                cp  

                                                                                             

18:47 Interpretation: Abnormal: PT 16.1.                                                      cp  

/                                                                                             

17:37 Order name: Ptt, Activated; Complete Time: 18:44                                        cp  

                                                                                             

17:45 Order name: Ckmb; Complete Time: 19:09                                                  cp  

                                                                                             

17:45 Order name: Troponin I; Complete Time: 18:44                                            cp  

                                                                                             

17:45 Order name: CK; Complete Time: 19:09                                                    cp  

                                                                                             

17:45 Order name: Magnesium; Complete Time: 19:09                                             cp  

                                                                                             

18:08 Order name: CBC Smear Scan; Complete Time: 19:09                                        EDMS

                                                                                             

18:48 Order name: Urine Microscopic Only                                                        

                                                                                             

19:14 Order name: Urine Dipstick--Ancillary (enter results)                                   2 

                                                                                             

19:28 Order name: Basic Metabolic Panel                                                       EDMS

                                                                                             

19:28 Order name: Basic Metabolic Panel                                                       EDMS

                                                                                             

19:28 Order name: CBC with Automated Diff                                                     EDMS

                                                                                             

12:37 Order name: XRAY Pelvis; Complete Time: 14:57                                           cp  

                                                                                             

12:37 Order name: XRAY Knee RIGHT 3 view; Complete Time: 14:57                                cp  

                                                                                             

12:37 Order name: XRAY Femur LEFT; Complete Time: 14:57                                       cp  

                                                                                             

12:37 Order name: XRAY Tib Fib LEFT; Complete Time: 14:57                                     cp  

                                                                                             

16:08 Order name: CT Pelvis wo Cont; Complete Time: 17:18                                     cp  

                                                                                             

16:08 Order name: IV; Complete Time: 17:08                                                    cp  

                                                                                             

16:14 Order name: Low Extremity Wo Cont; Complete Time: 17:18                                 EDMS

                                                                                             

17:45 Order name: EKG; Complete Time: 17:46                                                   cp  

                                                                                             

17:45 Order name: EKG - Nurse/Tech; Complete Time: 18:42                                      cp  

                                                                                             

19:28 Order name: Physical Therapy Consult                                                    [facility]

                                                                                             

19:28 Order name: Heart Healthy                                                               [facility]

                                                                                             

19:28 Order name: CBC with Automated Diff                                                     [facility]

                                                                                             

18:48 Order name: Urine Dipstick-Ancillary (obtain specimen); Complete Time: 19:02            cp  

                                                                                                  

EC:45 Rate is 77 beats/min. Rhythm is regular. RI interval is normal. QRS interval is normal. cp  

      QT interval is normal. T waves are Inverted in lead III. No ST changes noted.               

      Interpreted by me.                                                                          

                                                                                                  

Administered Medications:                                                                         

13:06 Drug: Hydrocodone-Acetaminophen (7.5 mg-325 mg) 1 tabs Route: PO;                       em  

16:12 Follow up: Response: No adverse reaction; Pain is decreased                             em  

17:08 Drug: fentaNYL (PF) 25 mcg Route: IVP; Site: right antecubital;                         hj  

17:38 Follow up: Response: No adverse reaction                                                em  

18:37 Drug: NS 0.9% 1000 ml Route: IV; Rate: 100 ml/hr; Site: right antecubital;              em  

20:09 Follow up: IV Status: Infusion continued upon admission                                 ao  

19:38 Drug: Rocephin - (cefTRIAXone) 1 grams Route: IVPB; Infused Over: 30 mins; Site: right  ao  

      antecubital;                                                                                

20:09 Follow up: IV Status: Completed infusion                                                ao  

                                                                                                  

                                                                                                  

Disposition:                                                                                      

18 19:14 Hospitalization ordered by Mac Sorto for Observation. Preliminary diagnosis    

  are Other slipping, tripping and stumbling and falls, Pain in left leg, Pain                    

  in left knee, Urinary tract infection, site not specified.                                      

- Bed requested for Telemetry/MedSurg (observation).                                              

- Status is Observation.                                                                      ao  

- Condition is Stable.                                                                            

- Problem is new.                                                                                 

- Symptoms have improved.                                                                         

UTI on Admission? Yes                                                                             

                                                                                                  

                                                                                                  

                                                                                                  

Addendum:                                                                                         

2018                                                                                        

     09:06 Co-signature as Attending Physician, Gentry Whalen MD I agree with the assessment and  c
ha

           plan of care.                                                                          

                                                                                                  

Signatures:                                                                                       

Dispatcher [employer]                           [facility]                                                 

Gentry Whalen MD MD cha Munoz, Edgar, LVN                       LVN  em                                                   

Darvin Ayala RN RN                                                      

Gentry Deluca PA PA cp Garcia, Cindy, RN RN                                                      

Pratt, Simeon, RN                         RN   ao                                                   

                                                                                                  

Corrections: (The following items were deleted from the chart)                                    

                                                                                             

18:45 18:44 Normal except: WBC 17.4; RBC 5.02. cp                                             cp  

19:35 19:14 Hospitalization Ordered by Mac Sorto MD for Observation. Preliminary diagnosis cg  

      is Other slipping, tripping and stumbling and falls; Pain in left leg; Pain in left         

      knee; Urinary tract infection, site not specified. Bed requested for Telemetry/MedSurg      

      (observation). Status is Observation. Condition is Stable. Problem is new. Symptoms         

      have improved. UTI on Admission? Yes. cp                                                    

20:47 19:35 2018 19:14 Hospitalization Ordered by Mac Sorto MD for Observation.      ao  

      Preliminary diagnosis is Other slipping, tripping and stumbling and falls; Pain in left     

      leg; Pain in left knee; Urinary tract infection, site not specified. Bed requested for      

      Telemetry/MedSurg (observation). Status is Observation. Condition is Stable. Problem is     

      new. Symptoms have improved. UTI on Admission? Yes. cg                                      

                                                                                                  

**************************************************************************************************

## 2018-05-12 NOTE — RAD REPORT
EXAM DESCRIPTION:  RAD - Pelvis - 5/12/2018 2:15 pm

 

CLINICAL HISTORY:  Fall, pelvic and hip pain

 

COMPARISON:  None.

 

TECHNIQUE:  AP imaging of the pelvis was obtained.

 

FINDINGS:  No fracture or dislocation findings. No AVN or focal femoral head abnormality. Lower lumba
r, SI joint and hip joint degenerative changes are present minimal in degree. No pathologic bone proc
ess. No suspicious soft tissue finding.

 

IMPRESSION:  Negative pelvis for acute or suspicious finding.

## 2018-05-12 NOTE — RAD REPORT
EXAM DESCRIPTION:  RAD - Tib Fib Left - 5/12/2018 2:15 pm

 

CLINICAL HISTORY:  Persistent pain following fall 3 days earlier

 

COMPARISON:  None.

 

FINDINGS:  No fracture is identified. There is no dislocation or periosteal reaction noted. No acute 
or suspicious bony finding. Large plantar spur is present.

 

No foreign body or other soft tissue abnormality.

 

IMPRESSION:  Negative left tibia & fibula examination for acute or suspicious finding.

 

Large plantar spur.

## 2018-05-13 LAB
BUN BLD-MCNC: 15 MG/DL (ref 6–20)
GLUCOSE SERPLBLD-MCNC: 134 MG/DL (ref 65–120)
HCT VFR BLD CALC: 37.9 % (ref 36–45)
LYMPHOCYTES # SPEC AUTO: 0.7 K/UL (ref 0.7–4.9)
MCH RBC QN AUTO: 28.7 PG (ref 27–35)
MCV RBC: 83.3 FL (ref 80–100)
PMV BLD: 8.2 FL (ref 7.6–11.3)
POTASSIUM SERPL-SCNC: 3.6 MEQ/L (ref 3.6–5)
RBC # BLD: 4.55 M/UL (ref 3.86–4.86)

## 2018-05-13 RX ADMIN — CIPROFLOXACIN SCH MLS: 2 INJECTION, SOLUTION INTRAVENOUS at 08:18

## 2018-05-13 RX ADMIN — SPIRONOLACTONE SCH MG: 25 TABLET, FILM COATED ORAL at 20:35

## 2018-05-13 RX ADMIN — ACETAMINOPHEN SCH MG: 325 TABLET ORAL at 12:00

## 2018-05-13 RX ADMIN — Medication SCH ML: at 08:19

## 2018-05-13 RX ADMIN — ENOXAPARIN SODIUM SCH MG: 40 INJECTION SUBCUTANEOUS at 17:04

## 2018-05-13 RX ADMIN — METRONIDAZOLE SCH MLS: 500 INJECTION, SOLUTION INTRAVENOUS at 00:26

## 2018-05-13 RX ADMIN — METRONIDAZOLE SCH MLS: 500 INJECTION, SOLUTION INTRAVENOUS at 05:36

## 2018-05-13 RX ADMIN — Medication SCH ML: at 20:36

## 2018-05-13 RX ADMIN — CIPROFLOXACIN SCH MLS: 2 INJECTION, SOLUTION INTRAVENOUS at 20:36

## 2018-05-13 RX ADMIN — ACETAMINOPHEN SCH MG: 325 TABLET ORAL at 23:28

## 2018-05-13 RX ADMIN — ACETAMINOPHEN SCH MG: 325 TABLET ORAL at 17:04

## 2018-05-13 RX ADMIN — ATORVASTATIN CALCIUM SCH MG: 10 TABLET, FILM COATED ORAL at 20:35

## 2018-05-13 NOTE — P.HP
Certification for Inpatient


Patient admitted to: Observation


With expected LOS: <2 Midnights


Patient will require the following post-hospital care: Home Health Services (

home PT)


Practitioner: I am a practitioner with admitting privileges, knowledge of 

patient current condition, hospital course, and medical plan of care.


Services: Services provided to patient in accordance with Admission 

requirements found in Title 42 Section 412.3 of the Code of Federal Regulations





Patient History


Date of Service: 05/13/18


Primary Care Provider: Sherice Roman


Reason for admission: UTI


History of Present Illness: 





Patient of ENRIQUE Roman.  She had a increased pain in her left foot on Wednesday. 

[place] it was gout. She hell due to this pain.  Was able to walk around quite 

well.  However her pain was worse on Friday.  She stated she could not stand 

and came to the ER.  She has been having some stomach pain for which she was 

being treated on cipro and flagyl.  The patient was also found to have a UTI in 

the ER.


Allergies





Penicillins Allergy (Verified 05/12/18 21:26)


 Rash


Sulfa (Sulfonamide Antibiotics) Allergy (Verified 05/12/18 21:26)


 Rash





Home Medications: 








Alendronate Sodium 35 mg PO EVERY 7TH DAY 05/12/18 


Aspirin [Pk Chewable] 81 mg PO M,W,F 05/12/18 


Atorvastatin Calcium [Lipitor] 10 mg PO DAILY 05/12/18 


Ca Carb/D3/Mag Ox//Vinayak/Zn [Caltrate+D3 Plus Mineral Minis] 2 tab PO DAILY 05 /12/18 


Ciprofloxacin HCl [Cipro 500 MG Tablet] 1 tab PO Q12H 05/12/18 


Glucosam/MSM/Vit C/Vinayak/Hrb#21 [Glucosamine-MSM Complex Tab] 1 tab PO DAILY 05/ 12/18 


Levothyroxine [Synthroid] 25 mcg PO [id number] 05/12/18 


Metronidazole [Flagyl] 500 mg PO Q6H 05/12/18 


Multivitamin/Iron/Folic Acid [Centrum Adults Tablet] 1 each PO DAILY 05/12/18 


Nadolol [Corgard*] 40 mg PO DAILY 05/12/18 


Omega-3/Dha/Epa/Fish Oil [Fish Oil 1,000 mg Softgel] 1 tab PO DAILY 05/12/18 


Ondansetron [Ondansetron Odt] 4 mg PO Q12H PRN 05/12/18 


Spironolactone 50 mg PO BID 05/12/18 


Vit C/E/Zn/Coppr/Lutein/Zeaxan [Preservision Areds 2 Softgel] 2 tab PO DAILY 05/ 12/18 


traMADol HCL [Ultram*] 1 tab PO Q8HP PRN 05/12/18 








- Past Medical/Surgical History


Has patient received pneumonia vaccine in the past: Yes


Diabetic: No


-: Diverticulitis


-: Gout


-: High Cholesterol


-: Hypertension


-: Hypothyroidism





- Social History


Smoking Status: Never smoker


Alcohol use: No


CD- Drugs: No


Caffeine use: No


Place of Residence: Home





Review of Systems


10-point ROS is otherwise unremarkable


Gastrointestinal: Abdominal Pain


Musculoskeletal: Leg Pain (left knee pain)





Physical Examination





- Vital Signs


Temperature: 98.1 F


Blood Pressure: 133/63


Pulse: 82


Respirations: 20


Pulse Ox (%): 98





- Physical Exam


General: Alert, In no apparent distress


HEENT: Atraumatic, PERRLA, Mucous membr. moist/pink, EOMI, Sclerae nonicteric


Neck: Supple, 2+ carotid pulse no bruit, No LAD, Without JVD or thyroid 

abnormality


Respiratory: Clear to auscultation bilaterally, Normal air movement


Cardiovascular: Regular rate/rhythm, Normal S1 S2


Gastrointestinal: Normal bowel sounds, No tenderness


Musculoskeletal: No tenderness, Swelling (left knee), Tenderness (left knee)


Integumentary: No rashes


Neurological: Normal gait, Normal speech, Normal strength at 5/5 x4 extr, 

Normal tone, Normal affect


Lymphatics: No axilla or inguinal lymphadenopathy





- Studies


Laboratory Data (last 24 hrs)





05/12/18 17:50: Troponin I < 0.03


05/12/18 17:50: Magnesium 1.6 L


05/12/18 17:50: PT 16.1 H, INR 1.36, APTT 28.3


05/12/18 17:50: Sodium 134 L, Potassium 4.3, BUN 16, Creatinine 1.07 H, Glucose 

155 H


05/12/18 17:50: WBC 17.4 H D, Hgb 14.0, Hct 42.3, Plt Count 292








Assessment and Plan





- Problems (Diagnosis)


(1) UTI (urinary tract infection)


Current Visit: Yes   Status: Acute   


Plan: 


Patient has a UTI with very elevated wbc.  Will switch her to IV medications


Qualifiers: 


   Urinary tract infection type: acute cystitis   Hematuria presence: without 

hematuria   Qualified Code(s): N30.00 - Acute cystitis without hematuria   





(2) Diverticulosis


Current Visit: Yes   Status: Acute   


Plan: 


diverticulosis of the sigmoid colon.  Improving on flagyl and cipro


Qualifiers: 


   Diverticulosis site: diverticulosis of large intestine   Diverticulosis 

bleeding: diverticulosis without bleeding   Qualified Code(s): K57.30 - 

Diverticulosis of large intestine without perforation or abscess without 

bleeding   





(3) Left knee pain


Current Visit: Yes   Status: Acute   


Plan: 


Will start the patient on a regular dosage of acetaminophen.  Will refer to PT 

and possible home PT or outpatient PT.  Her   is willing to drive her


Qualifiers: 


   Chronicity: acute   Qualified Code(s): M25.562 - Pain in left knee   





(4) HTN (hypertension)


Current Visit: Yes   Status: Acute   


Discharge Plan: Home


Plan to discharge in: 24 Hours





- Advance Directives


Does patient have a Living Will: Yes


Does patient have a Durable POA for Healthcare: Yes





- Code Status/Comfort Care


Code Status Assessed: No


Code Status: Full Code


Physician Review: Patient Assessed, Agree with Above Assessment and Plan


Critical Care: No


Time Spent Managing Pts Care (In Minutes): 40

## 2018-05-13 NOTE — EKG
Test Date:    2018-05-12               Test Time:    18:38:11

Technician:   CAITLYN                                    

                                                     

MEASUREMENT RESULTS:                                       

Intervals:                                           

Rate:         77                                     

HI:           162                                    

QRSD:         80                                     

QT:           394                                    

QTc:          445                                    

Axis:                                                

P:            25                                     

HI:           162                                    

QRS:          16                                     

T:            8                                      

                                                     

INTERPRETIVE STATEMENTS:                                       

                                                     

Normal sinus rhythm

Cannot rule out Anterior infarct, age undetermined

Abnormal ECG

No previous ECG available for comparison



Electronically Signed On 05-13-18 15:23:25 CDT by Gilson Oliveira

## 2018-05-14 LAB
BUN BLD-MCNC: 13 MG/DL (ref 6–20)
GLUCOSE SERPLBLD-MCNC: 122 MG/DL (ref 65–120)
HCT VFR BLD CALC: 38.2 % (ref 36–45)
LYMPHOCYTES # SPEC AUTO: 0.8 K/UL (ref 0.7–4.9)
MCH RBC QN AUTO: 27.9 PG (ref 27–35)
MCV RBC: 84.2 FL (ref 80–100)
PMV BLD: 8.2 FL (ref 7.6–11.3)
POTASSIUM SERPL-SCNC: 4 MEQ/L (ref 3.6–5)
RBC # BLD: 4.54 M/UL (ref 3.86–4.86)
TSH SERPL DL<=0.05 MIU/L-ACNC: 2.95 UIU/ML (ref 0.34–5.6)

## 2018-05-14 RX ADMIN — Medication SCH: at 20:51

## 2018-05-14 RX ADMIN — CIPROFLOXACIN SCH MG: 500 TABLET, FILM COATED ORAL at 21:31

## 2018-05-14 RX ADMIN — NADOLOL SCH MG: 20 TABLET ORAL at 09:17

## 2018-05-14 RX ADMIN — Medication SCH TAB: at 09:17

## 2018-05-14 RX ADMIN — PANTOPRAZOLE SODIUM SCH MG: 40 TABLET, DELAYED RELEASE ORAL at 05:29

## 2018-05-14 RX ADMIN — I-VITE, TAB 1000-60-2MG (60/BT) SCH TAB: TAB at 09:17

## 2018-05-14 RX ADMIN — CIPROFLOXACIN SCH MLS: 2 INJECTION, SOLUTION INTRAVENOUS at 09:18

## 2018-05-14 RX ADMIN — Medication SCH: at 09:00

## 2018-05-14 RX ADMIN — Medication SCH ML: at 20:37

## 2018-05-14 RX ADMIN — SPIRONOLACTONE SCH MG: 25 TABLET, FILM COATED ORAL at 09:17

## 2018-05-14 RX ADMIN — CIPROFLOXACIN SCH MLS: 2 INJECTION, SOLUTION INTRAVENOUS at 20:37

## 2018-05-14 RX ADMIN — ACETAMINOPHEN SCH: 325 TABLET ORAL at 12:00

## 2018-05-14 RX ADMIN — SPIRONOLACTONE SCH MG: 25 TABLET, FILM COATED ORAL at 20:36

## 2018-05-14 RX ADMIN — CALCIUM CARBONATE-VITAMIN D TAB 500 MG-200 UNIT SCH TAB: 500-200 TAB at 09:16

## 2018-05-14 RX ADMIN — Medication SCH ML: at 09:18

## 2018-05-14 RX ADMIN — ENOXAPARIN SODIUM SCH MG: 40 INJECTION SUBCUTANEOUS at 16:28

## 2018-05-14 RX ADMIN — ACETAMINOPHEN SCH MG: 325 TABLET ORAL at 16:29

## 2018-05-14 RX ADMIN — LEVOTHYROXINE SODIUM SCH MG: 25 TABLET ORAL at 05:29

## 2018-05-14 RX ADMIN — CIPROFLOXACIN SCH: 2 INJECTION, SOLUTION INTRAVENOUS at 20:51

## 2018-05-14 RX ADMIN — Medication SCH CAP: at 09:18

## 2018-05-14 RX ADMIN — ACETAMINOPHEN SCH MG: 325 TABLET ORAL at 05:29

## 2018-05-14 RX ADMIN — ATORVASTATIN CALCIUM SCH MG: 10 TABLET, FILM COATED ORAL at 20:36

## 2018-05-14 NOTE — P.DS
Admission Date: 05/12/18


Discharge Date: 05/14/18


Primary Care Provider: Sherice Roman


Disposition: ROUTINE DISCHARGE


Discharge Condition: GOOD


Reason for Admission: UTI





- Problems


(1) UTI (urinary tract infection)


Current Visit: Yes   Status: Acute   


Qualifiers: 


   Urinary tract infection type: acute cystitis   Hematuria presence: without 

hematuria   Qualified Code(s): N30.00 - Acute cystitis without hematuria   





(2) Diverticulosis


Current Visit: Yes   Status: Acute   


Qualifiers: 


   Diverticulosis site: diverticulosis of large intestine   Diverticulosis 

bleeding: diverticulosis without bleeding   Qualified Code(s): K57.30 - 

Diverticulosis of large intestine without perforation or abscess without 

bleeding   





(3) Left knee pain


Current Visit: Yes   Status: Acute   


Qualifiers: 


   Chronicity: acute   Qualified Code(s): M25.562 - Pain in left knee   





(4) HTN (hypertension)


Current Visit: Yes   Status: Acute   


Brief History of Present Illness: 





Patient of ENRIQUE Roman.  She had a increased pain in her left foot on Wednesday. 

[place] it was gout. She hell due to this pain.  Was able to walk around quite 

well.  However her pain was worse on Friday.  She stated she could not stand 

and came to the ER.  She has been having some stomach pain for which she was 

being treated on cipro and flagyl.  The patient was also found to have a UTI in 

the ER.


Hospital Course: 





Patient is doing well.  Discharge home on scheduled dose of acetaminophen.  She 

has tramadol which she can use for break through pain.  Have the patient go to 

the outpatient PT center. Next to [facility] office.


Vital Signs/Physical Exam: 














Temp Pulse Resp BP Pulse Ox


 


 97.5 F   87   16   139/70   95 


 


 05/14/18 08:00  05/14/18 09:17  05/14/18 08:00  05/14/18 09:17  05/14/18 08:00








General: Alert, In no apparent distress


HEENT: Atraumatic, PERRLA, EOMI


Neck: Supple, JVD not distended


Respiratory: Clear to auscultation bilaterally, Normal air movement


Cardiovascular: Regular rate/rhythm, Normal S1 S2


Gastrointestinal: Normal bowel sounds, No tenderness


Musculoskeletal: No tenderness


Integumentary: No rashes


Neurological: Normal speech, Normal tone, Normal affect


Lymphatics: No axilla or inguinal lymphadenopathy


Laboratory Data at Discharge: 














WBC  13.7 K/uL (4.3-10.9)  H  05/14/18  05:41    


 


Hgb  12.7 g/dL (12.0-15.0)   05/14/18  05:41    


 


Hct  38.2 % (36.0-45.0)   05/14/18  05:41    


 


Plt Count  240 K/uL (152-406)   05/14/18  05:41    


 


PT  16.1 SECONDS (9.5-12.5)  H  05/12/18  17:50    


 


INR  1.36   05/12/18  17:50    


 


APTT  28.3 SECONDS (24.3-36.9)   05/12/18  17:50    


 


Sodium  134 mEq/L (135-145)  L  05/14/18  05:41    


 


Potassium  4.0 mEq/L (3.6-5.0)   05/14/18  05:41    


 


BUN  13 mg/dL (6-20)   05/14/18  05:41    


 


Creatinine  0.86 mg/dL (0.44-1.00)   05/14/18  05:41    


 


Glucose  122 mg/dL ()  H  05/14/18  05:41    


 


Uric Acid  6.0 mg/dL (2.6-8.0)   05/13/18  11:58    


 


Magnesium  1.6 mg/dL (1.8-2.5)  L  05/12/18  17:50    


 


Troponin I  < 0.03 ng/mL (<0.03)   05/12/18  17:50    








Home Medications: 








Alendronate Sodium 35 mg PO EVERY 7TH DAY 05/12/18 


Aspirin [Pk Chewable] 81 mg PO M,W,F 05/12/18 


Atorvastatin Calcium [Lipitor] 10 mg PO DAILY 05/12/18 


Ca Carb/D3/Mag Ox//Vinayak/Zn [Caltrate+D3 Plus Mineral Minis] 2 tab PO DAILY 05 /12/18 


Ciprofloxacin HCl [Cipro 500 MG Tablet] 1 tab PO Q12H 05/12/18 


Glucosam/MSM/Vit C/Vinayak/Hrb#21 [Glucosamine-MSM Complex Tab] 1 tab PO DAILY 05/ 12/18 


Levothyroxine [Synthroid] 25 mcg PO [id number] 05/12/18 


Metronidazole [Flagyl] 500 mg PO Q6H 05/12/18 


Multivitamin/Iron/Folic Acid [Centrum Adults Tablet] 1 each PO DAILY 05/12/18 


Nadolol [Corgard*] 40 mg PO DAILY 05/12/18 


Omega-3/Dha/Epa/Fish Oil [Fish Oil 1,000 mg Softgel] 1 tab PO DAILY 05/12/18 


Ondansetron [Ondansetron Odt] 4 mg PO Q12H PRN 05/12/18 


Spironolactone 50 mg PO BID 05/12/18 


Vit C/E/Zn/Coppr/Lutein/Zeaxan [Preservision Areds 2 Softgel] 2 tab PO DAILY 05/ 12/18 


traMADol HCL [Ultram*] 1 tab PO Q8HP PRN 05/12/18 





Diet: Regular


Activity: Fall precautions


Followup: 


Sherice Roman NP [ALLIED HEALTH PROFESSIONAL] - 1 Week


Time spent managing pt's care (in minutes): 30

## 2018-05-15 LAB
BUN BLD-MCNC: 17 MG/DL (ref 6–20)
GLUCOSE SERPLBLD-MCNC: 114 MG/DL (ref 65–120)
HCT VFR BLD CALC: 34.8 % (ref 36–45)
LYMPHOCYTES # SPEC AUTO: 0.9 K/UL (ref 0.7–4.9)
MCH RBC QN AUTO: 28.8 PG (ref 27–35)
MCV RBC: 84 FL (ref 80–100)
PMV BLD: 8 FL (ref 7.6–11.3)
POTASSIUM SERPL-SCNC: 3.8 MEQ/L (ref 3.6–5)
RBC # BLD: 4.15 M/UL (ref 3.86–4.86)

## 2018-05-15 RX ADMIN — Medication SCH: at 21:00

## 2018-05-15 RX ADMIN — ACETAMINOPHEN SCH MG: 325 TABLET ORAL at 00:49

## 2018-05-15 RX ADMIN — CIPROFLOXACIN SCH MG: 500 TABLET, FILM COATED ORAL at 09:04

## 2018-05-15 RX ADMIN — CALCIUM CARBONATE-VITAMIN D TAB 500 MG-200 UNIT SCH TAB: 500-200 TAB at 09:05

## 2018-05-15 RX ADMIN — LEVOTHYROXINE SODIUM SCH MG: 25 TABLET ORAL at 05:55

## 2018-05-15 RX ADMIN — CIPROFLOXACIN SCH MG: 500 TABLET, FILM COATED ORAL at 22:05

## 2018-05-15 RX ADMIN — I-VITE, TAB 1000-60-2MG (60/BT) SCH TAB: TAB at 09:05

## 2018-05-15 RX ADMIN — CIPROFLOXACIN SCH MG: 500 TABLET, FILM COATED ORAL at 22:06

## 2018-05-15 RX ADMIN — NADOLOL SCH MG: 20 TABLET ORAL at 09:05

## 2018-05-15 RX ADMIN — ATORVASTATIN CALCIUM SCH MG: 10 TABLET, FILM COATED ORAL at 22:07

## 2018-05-15 RX ADMIN — Medication SCH TAB: at 09:05

## 2018-05-15 RX ADMIN — ACETAMINOPHEN SCH MG: 325 TABLET ORAL at 17:23

## 2018-05-15 RX ADMIN — ACETAMINOPHEN SCH MG: 325 TABLET ORAL at 12:00

## 2018-05-15 RX ADMIN — SPIRONOLACTONE SCH MG: 25 TABLET, FILM COATED ORAL at 22:05

## 2018-05-15 RX ADMIN — Medication SCH CAP: at 09:04

## 2018-05-15 RX ADMIN — Medication SCH MG: at 09:05

## 2018-05-15 RX ADMIN — SPIRONOLACTONE SCH MG: 25 TABLET, FILM COATED ORAL at 09:04

## 2018-05-15 RX ADMIN — ENOXAPARIN SODIUM SCH MG: 40 INJECTION SUBCUTANEOUS at 17:23

## 2018-05-15 RX ADMIN — Medication SCH: at 09:00

## 2018-05-15 RX ADMIN — ACETAMINOPHEN SCH MG: 325 TABLET ORAL at 05:55

## 2018-05-15 RX ADMIN — PANTOPRAZOLE SODIUM SCH MG: 40 TABLET, DELAYED RELEASE ORAL at 05:55

## 2018-05-15 NOTE — P.PN
Subjective


Date of Service: 05/15/18


Primary Care Provider: Sherice Roman


Chief Complaint: UTI


Subjective: Worsening (Pt was unable to walk with PT yesterday.  So discharge 

held)





Review of Systems


10-point ROS is otherwise unremarkable


General: Weakness


Musculoskeletal: Leg Pain (swelling of the knee and lower leg)





Physical Examination





- Vital Signs


Temperature: 97.5 F


Blood Pressure: 128/60


Pulse: 70


Respirations: 16


Pulse Ox (%): 98





- Physical Exam


General: Alert, In no apparent distress


HEENT: Atraumatic, PERRLA, EOMI


Neck: Supple, JVD not distended


Respiratory: Clear to auscultation bilaterally, Normal air movement


Cardiovascular: Regular rate/rhythm, Normal S1 S2


Gastrointestinal: Normal bowel sounds, No tenderness


Musculoskeletal: Swelling (left knee), Erythema (left knee), Tenderness (left 

knee)


Integumentary: No rashes


Neurological: Normal speech, Normal tone, Normal affect


Lymphatics: No axilla or inguinal lymphadenopathy





- Studies


Microbiology Data (last 24 hrs): 








05/12/18 19:05   Clean Catch Urine   [place] Count - Final


                            <10,000 CFU/ML.


05/12/18 19:05   Clean Catch Urine    - Final








Assessment & Plan





- Problems (Diagnosis)


(1) Left knee pain


Onset Date: 05/14/18   Current Visit: Yes   Status: Acute   


Plan: 


awaiting in pt rehab evaluation.  PT states she was trying hard.  Will consult 

Dr. Escobar.  Check a venous duplex of the lower extremity


Qualifiers: 


   Chronicity: acute   Qualified Code(s): M25.562 - Pain in left knee   





(2) UTI (urinary tract infection)


Onset Date: 05/14/18   Current Visit: Yes   Status: Acute   


Plan: 


Patient has a UTI with very elevated wbc.  Will switch her to IV medications


Qualifiers: 


   Urinary tract infection type: acute cystitis   Hematuria presence: without 

hematuria   Qualified Code(s): N30.00 - Acute cystitis without hematuria   





(3) Diverticulosis


Onset Date: 05/14/18   Current Visit: Yes   Status: Acute   


Plan: 


diverticulosis of the sigmoid colon.  Improving on flagyl and cipro


Qualifiers: 


   Diverticulosis site: diverticulosis of large intestine   Diverticulosis 

bleeding: diverticulosis without bleeding   Qualified Code(s): K57.30 - 

Diverticulosis of large intestine without perforation or abscess without 

bleeding   





(4) HTN (hypertension)


Onset Date: 05/14/18   Current Visit: Yes   Status: Acute   


Discharge Plan: Home


Plan to discharge in: 48 Hours





- Code Status/Comfort Care


Code Status Assessed: No


Code Status: Full Code


Physician Review: Patient Assessed, Agree with Above Assessment and Plan


Critical Care: No


Time Spent Managing Pts Care (In Minutes): 25

## 2018-05-15 NOTE — RAD REPORT
EXAM DESCRIPTION:  VAS - Extremity Venous Uni Ltd - 5/15/2018 10:19 am

 

CLINICAL HISTORY:  Leg swelling and edema.

 

COMPARISON:  None.

 

FINDINGS:  Left lower extremity venous system was interrogated with Doppler technique. Normal flow, c
ompressibility and augmentation was noted. There is no DVT present.

 

IMPRESSION:  No evidence of left lower extremity deep venous thrombosis.

## 2018-05-16 LAB
HCT VFR BLD CALC: 36.7 % (ref 36–45)
LYMPHOCYTES # SPEC AUTO: 0.9 K/UL (ref 0.7–4.9)
MCH RBC QN AUTO: 27.8 PG (ref 27–35)
MCV RBC: 83.5 FL (ref 80–100)
PMV BLD: 7.9 FL (ref 7.6–11.3)
RBC # BLD: 4.39 M/UL (ref 3.86–4.86)

## 2018-05-16 RX ADMIN — SPIRONOLACTONE SCH MG: 25 TABLET, FILM COATED ORAL at 08:37

## 2018-05-16 RX ADMIN — ACETAMINOPHEN SCH: 325 TABLET ORAL at 04:24

## 2018-05-16 RX ADMIN — Medication SCH MG: at 08:45

## 2018-05-16 RX ADMIN — CIPROFLOXACIN SCH MG: 500 TABLET, FILM COATED ORAL at 08:46

## 2018-05-16 RX ADMIN — Medication SCH CAP: at 11:12

## 2018-05-16 RX ADMIN — Medication SCH: at 08:45

## 2018-05-16 RX ADMIN — ACETAMINOPHEN SCH: 325 TABLET ORAL at 00:00

## 2018-05-16 RX ADMIN — Medication SCH TAB: at 08:37

## 2018-05-16 RX ADMIN — I-VITE, TAB 1000-60-2MG (60/BT) SCH TAB: TAB at 08:37

## 2018-05-16 RX ADMIN — ACETAMINOPHEN SCH: 325 TABLET ORAL at 11:15

## 2018-05-16 RX ADMIN — NADOLOL SCH MG: 20 TABLET ORAL at 08:44

## 2018-05-16 RX ADMIN — LEVOTHYROXINE SODIUM SCH MG: 25 TABLET ORAL at 05:28

## 2018-05-16 RX ADMIN — PANTOPRAZOLE SODIUM SCH MG: 40 TABLET, DELAYED RELEASE ORAL at 05:30

## 2018-05-16 RX ADMIN — CALCIUM CARBONATE-VITAMIN D TAB 500 MG-200 UNIT SCH TAB: 500-200 TAB at 08:37

## 2018-05-16 NOTE — CON
Date of Consultation:  05/15/2018



This is my first time seeing this patient to my knowledge.  She is a 72-year-old female, who unfortun
ately said that she had a bout of gout last week and that her foot was hurting her, but she was impro
ving until she stepped from one room to the other and her left foot hurt.  The pain was severe enough
 that it caused her to fall.  After she fell, she said that she had some soreness, but she was able t
o ambulate and able to go back and forth to the bathroom and around her home without much difficulty 
until Friday per her report.  On Friday, she really could get up and go to the bathroom, and therefor
e went to the emergency department.  In the emergency department, she was evaluated and she had x-ray
s of her femur, x-rays of her knee, and x-rays of her pelvis as well as x-rays of her tib-fib and a p
elvic CT.  She also had a lower extremity CT, which was done on 05/12.  Today is Tuesday, so that wou
ld place that study on Saturday, although she said that she was taken to the emergency room on Friday
.  Apparently since that time, she has been trying to participate in physical therapy, but says she w
as unable to ambulate because of pain, primarily in her left knee. 



On physical examination, she has a small ecchymosis on the right knee.  She has a more significant ec
chymosis on the medial aspect of her left knee and proximal tibia.  Otherwise, range of motion of her
 hip is absolutely nonpainful with log roll.  Also palpation in the region of the hip does not cause 
any pain.  On examination of her left knee, she is able to maintain a straight leg raise.  She appear
s stable to varus and valgus stress at 0 and 30 degrees.  There may be an effusion, effusion is diffi
cult to quantify because of the patient's habitus.  She does complain of pain with palpation and chu
pulation of the left knee. 



Review of her studies reveal no fracture or dislocation in the pelvis on CT or x-ray.  No fracture or
 dislocation in the left femur on x-ray and CT, and no fracture of the left knee on x-ray and CT.  Marian whaley currently does not complain of much pain related to her foot and definitely does not refer to her f
oot as being the most painful problem.  She does have some degenerative changes of both knees; lisa
r, I would say it was more significant on the right.



Assessment:  At this time, she absolutely denies hip pain.  If she did have hip pain, we may move for
gray with MRI to assess for any fracture of the hip, which would be occult this.  I have seen happen 
after a fall and lead to inability to bear weight; however, we would think that 2 days of walking wou
ld probably lead this more closely to this and a CT scan would be helpful, although not conclusive.  
More conclusive would be absolutely no pain with movement or manipulation of her hip at this time.  W
ith regard to her knee, I do not see any orthopedic surgical emergency.  There is no sign of infectio
n.  No evidence of posterior lateral corner tear.  There is no fracture on x-ray or CT.  The patellar
 tendon and quadriceps tendon appear intact.  Assessment is a relatively deconditioned patient now wi
th complaints of pain in both of her knees, left being much more severe.  She currently says that she
 cannot bear weight and has significant pain related to her knee.  As this is from an injury, I belie
ve that a corticosteroid injection would be ill-advised; however, I believe that she should be able t
o be weightbearing as tolerated.  If she begins to complain of pain in her hip, an MRI would be recom
mended; however, she at this time has no pain related to her hip.  Otherwise, I believe that she can 
mobilize as tolerated and she is at least be taught transfers.  I hope that she simply has contusion 
to her knee; however, ACL tear or meniscal pathology is not completely eliminated and these would not
 be considered emergent and we would assess for improvement.  If she felt to improve, an MRI may be h
elpful; however, neither an ACL tear nor a meniscal tear would be a surgical emergency and neither on
e would cause her to be bed-bound and she could be weightbearing as tolerated.  If that were indeed h
er problem and would not really change her current management.  I will speak with Dr. Sorto and also 
speak with the patient again as well.





/PADMAJA

DD:  05/15/2018 12:23:18Voice ID:  [id number]

DT:  05/16/2018 11:04:45Report ID:  [id number]

## 2018-05-16 NOTE — P.DS
Admission Date: 05/12/18


Discharge Date: 05/16/18


Primary Care Provider: Sherice Roman


Disposition: ROUTINE DISCHARGE


Discharge Condition: GOOD


Reason for Admission: UTI





- Problems


(1) Left knee pain


Onset Date: 05/14/18   Current Visit: Yes   Status: Acute   


Qualifiers: 


   Chronicity: acute   Qualified Code(s): M25.562 - Pain in left knee   





(2) UTI (urinary tract infection)


Onset Date: 05/14/18   Current Visit: Yes   Status: Acute   


Qualifiers: 


   Urinary tract infection type: acute cystitis   Hematuria presence: without 

hematuria   Qualified Code(s): N30.00 - Acute cystitis without hematuria   





(3) Diverticulosis


Onset Date: 05/14/18   Current Visit: Yes   Status: Acute   


Qualifiers: 


   Diverticulosis site: diverticulosis of large intestine   Diverticulosis 

bleeding: diverticulosis without bleeding   Qualified Code(s): K57.30 - 

Diverticulosis of large intestine without perforation or abscess without 

bleeding   





(4) HTN (hypertension)


Onset Date: 05/14/18   Current Visit: Yes   Status: Acute   


Brief History of Present Illness: 





Patient of ENRIQUE Roman.  She had a increased pain in her left foot on Wednesday. 

[place] it was gout. She hell due to this pain.  Was able to walk around quite 

well.  However her pain was worse on Friday.  She stated she could not stand 

and came to the ER.  She has been having some stomach pain for which she was 

being treated on cipro and flagyl.  The patient was also found to have a UTI in 

the ER.


Hospital Course: 





Patient was admitted with pain s/p fall.  She has had a negative workup and 

exam by Dr. Diana.  The patient was not able to ambulate.  PT states she 

was trying her best to work with them.  PER her PCP ENRIQUE Roman the patient is not 

prone to malingering.  So despite not finding a reason I believe she is having 

true pain and cannot ambulate.  This caused difficulty in discharge.  She does 

not qualify for SNF.  Was not accepted by Rehab.   She was offered self pay in 

[facility] where PT will be paid for.  However she will have to self pay the 

stay.  The patient is willing to do this.  Will order an MRI of the left hip.  

This may be an occult hip fracture.  Which can be missed on CT.


Vital Signs/Physical Exam: 














Temp Pulse Resp BP Pulse Ox


 


 97.7 F   86   16   149/65 H  95 


 


 05/16/18 08:00  05/16/18 08:44  05/16/18 08:00  05/16/18 08:44  05/16/18 08:00








Laboratory Data at Discharge: 














WBC  9.8 K/uL (4.3-10.9)   05/16/18  05:46    


 


Hgb  12.2 g/dL (12.0-15.0)   05/16/18  05:46    


 


Hct  36.7 % (36.0-45.0)   05/16/18  05:46    


 


Plt Count  323 K/uL (152-406)  D 05/16/18  05:46    


 


PT  16.1 SECONDS (9.5-12.5)  H  05/12/18  17:50    


 


INR  1.36   05/12/18  17:50    


 


APTT  28.3 SECONDS (24.3-36.9)   05/12/18  17:50    


 


Sodium  136 mEq/L (135-145)   05/15/18  05:36    


 


Potassium  3.8 mEq/L (3.6-5.0)   05/15/18  05:36    


 


BUN  17 mg/dL (6-20)   05/15/18  05:36    


 


Creatinine  0.88 mg/dL (0.44-1.00)   05/15/18  05:36    


 


Glucose  114 mg/dL ()   05/15/18  05:36    


 


Uric Acid  6.0 mg/dL (2.6-8.0)   05/13/18  11:58    


 


Magnesium  1.6 mg/dL (1.8-2.5)  L  05/12/18  17:50    


 


Troponin I  < 0.03 ng/mL (<0.03)   05/12/18  17:50    








Home Medications: 








Alendronate Sodium 35 mg PO EVERY 7TH DAY 05/12/18 


Aspirin [Pk Chewable] 81 mg PO M,W,F 05/12/18 


Atorvastatin Calcium [Lipitor] 10 mg PO DAILY 05/12/18 


Ca Carb/D3/Mag Ox//Vinayak/Zn [Caltrate+D3 Plus Mineral Minis] 2 tab PO DAILY 05 /12/18 


Ciprofloxacin HCl [Cipro 500 MG Tablet] 1 tab PO Q12H 05/12/18 


Glucosam/MSM/Vit C/Vinayak/Hrb#21 [Glucosamine-MSM Complex Tab] 1 tab PO DAILY 05/ 12/18 


Levothyroxine [Synthroid] 25 mcg PO [id number] 05/12/18 


Metronidazole [Flagyl] 500 mg PO Q6H 05/12/18 


Multivitamin/Iron/Folic Acid [Centrum Adults Tablet] 1 each PO DAILY 05/12/18 


Nadolol [Corgard*] 40 mg PO DAILY 05/12/18 


Omega-3/Dha/Epa/Fish Oil [Fish Oil 1,000 mg Softgel] 1 tab PO DAILY 05/12/18 


Ondansetron [Ondansetron Odt] 4 mg PO Q12H PRN 05/12/18 


Spironolactone 50 mg PO BID 05/12/18 


Vit C/E/Zn/Coppr/Lutein/Zeaxan [Preservision Areds 2 Softgel] 2 tab PO DAILY 05/ 12/18 


traMADol HCL [Ultram*] 1 tab PO Q8HP PRN 05/12/18 





Patient Discharge Instructions: please arrange Out patient MRI of the left hip.


Diet: Regular


Activity: Fall precautions


Followup: 


Sherice Roman NP [ALLIED HEALTH PROFESSIONAL] - 1 Week